# Patient Record
Sex: MALE | Race: WHITE | NOT HISPANIC OR LATINO | Employment: OTHER | URBAN - NONMETROPOLITAN AREA
[De-identification: names, ages, dates, MRNs, and addresses within clinical notes are randomized per-mention and may not be internally consistent; named-entity substitution may affect disease eponyms.]

---

## 2018-12-03 ENCOUNTER — OFFICE VISIT CONVERTED (OUTPATIENT)
Dept: FAMILY MEDICINE CLINIC | Age: 68
End: 2018-12-03
Attending: NURSE PRACTITIONER

## 2019-11-18 ENCOUNTER — HOSPITAL ENCOUNTER (OUTPATIENT)
Dept: SURGERY | Facility: CLINIC | Age: 69
Discharge: HOME | End: 2019-11-18
Attending: UROLOGY

## 2019-11-18 ENCOUNTER — OFFICE VISIT CONVERTED (OUTPATIENT)
Dept: UROLOGY | Facility: CLINIC | Age: 69
End: 2019-11-18
Attending: UROLOGY

## 2019-11-21 LAB
AMOXICILLIN+CLAV SUSC ISLT: 4
AMPICILLIN SUSC ISLT: >=32
AMPICILLIN+SULBAC SUSC ISLT: 16
BACTERIA UR CULT: ABNORMAL
CEFAZOLIN SUSC ISLT: >=64
CEFEPIME SUSC ISLT: 2
CEFTAZIDIME SUSC ISLT: 16
CEFTRIAXONE SUSC ISLT: >=64
CEFUROXIME ORAL SUSC ISLT: >=64
CEFUROXIME PARENTER SUSC ISLT: >=64
CIPROFLOXACIN SUSC ISLT: >=4
ERTAPENEM SUSC ISLT: <=0.5
GENTAMICIN SUSC ISLT: <=1
LEVOFLOXACIN SUSC ISLT: >=8
NITROFURANTOIN SUSC ISLT: <=16
TETRACYCLINE SUSC ISLT: >=16
TMP SMX SUSC ISLT: >=320
TOBRAMYCIN SUSC ISLT: <=1

## 2020-02-03 ENCOUNTER — HOSPITAL ENCOUNTER (OUTPATIENT)
Dept: OTHER | Facility: HOSPITAL | Age: 70
Discharge: HOME | End: 2020-02-03
Attending: UROLOGY

## 2020-02-05 LAB — BACTERIA UR CULT: NORMAL

## 2020-02-17 ENCOUNTER — HOSPITAL ENCOUNTER (OUTPATIENT)
Dept: OTHER | Facility: HOSPITAL | Age: 70
Discharge: HOME | End: 2020-02-17
Attending: UROLOGY

## 2020-03-09 ENCOUNTER — OFFICE VISIT CONVERTED (OUTPATIENT)
Dept: UROLOGY | Facility: CLINIC | Age: 70
End: 2020-03-09
Attending: UROLOGY

## 2020-03-09 ENCOUNTER — HOSPITAL ENCOUNTER (OUTPATIENT)
Dept: OTHER | Facility: HOSPITAL | Age: 70
Discharge: HOME | End: 2020-03-09
Attending: UROLOGY

## 2020-03-09 LAB
ANION GAP SERPL CALC-SCNC: 17 MMOL/L (ref 8–19)
BUN SERPL-MCNC: 22 MG/DL (ref 5–25)
BUN/CREAT SERPL: 17 {RATIO} (ref 6–20)
CALCIUM SERPL-MCNC: 9.8 MG/DL (ref 8.7–10.4)
CHLORIDE SERPL-SCNC: 102 MMOL/L (ref 99–111)
CONV CO2: 24 MMOL/L (ref 22–32)
CREAT UR-MCNC: 1.29 MG/DL (ref 0.7–1.2)
GFR SERPLBLD BASED ON 1.73 SQ M-ARVRAT: 56 ML/MIN/{1.73_M2}
GLUCOSE SERPL-MCNC: 122 MG/DL (ref 70–99)
OSMOLALITY SERPL CALC.SUM OF ELEC: 291 MOSM/KG (ref 273–304)
POTASSIUM SERPL-SCNC: 4.5 MMOL/L (ref 3.5–5.3)
SODIUM SERPL-SCNC: 138 MMOL/L (ref 135–147)

## 2020-06-15 ENCOUNTER — OFFICE VISIT CONVERTED (OUTPATIENT)
Dept: FAMILY MEDICINE CLINIC | Age: 70
End: 2020-06-15
Attending: FAMILY MEDICINE

## 2020-06-15 ENCOUNTER — HOSPITAL ENCOUNTER (OUTPATIENT)
Dept: OTHER | Facility: HOSPITAL | Age: 70
Discharge: HOME | End: 2020-06-15
Attending: FAMILY MEDICINE

## 2020-11-04 ENCOUNTER — HOSPITAL ENCOUNTER (OUTPATIENT)
Dept: OTHER | Facility: HOSPITAL | Age: 70
Discharge: HOME | End: 2020-11-04
Attending: FAMILY MEDICINE

## 2020-11-07 LAB — SARS-COV-2 RNA SPEC QL NAA+PROBE: NOT DETECTED

## 2021-05-15 VITALS — BODY MASS INDEX: 30.8 KG/M2 | HEIGHT: 70 IN | WEIGHT: 215.12 LBS | RESPIRATION RATE: 14 BRPM

## 2021-05-15 VITALS — HEIGHT: 70 IN | WEIGHT: 219.25 LBS | BODY MASS INDEX: 31.39 KG/M2 | RESPIRATION RATE: 18 BRPM

## 2021-05-18 NOTE — PROGRESS NOTES
Fredy Whitfield 1950     Office/Outpatient Visit    Visit Date: Mon, Dec 3, 2018 02:27 pm    Provider: Emily Reyes N.P. (Assistant: Mirna Tesfaye MA)    Location: [facility]        Electronically signed by Emily Reyes N.P. on  12/03/2018 03:41:39 PM                             SUBJECTIVE:        CC:     Luis is a 68 year old White male.  This is his first visit to the clinic.  Patient presents today for new patient establishment, also has complaints of nose being sore X 3 days         HPI: Fredy lives in both [place] and [place]. He moved back here for the winter about 6 weeks ago. He has history of DM2, afib, and CVA. Reports that he his family physician in [place] is Dr. Javid Damian and his cardiologist is Dr. Doyle. He last saw Dr. Damian about 6 weeks ago. HgbA1C was in the 8s. He is on injectable DM medication as well as Metformin but does not remember the name. On statin and Xarelto.     He presents today with c/o 3 day history of nasal soreness and swelling. Hurts on inside of nose and outside. His wife recently saw dermatologist Dr. Purdy. She has been diagnosed with staph infection. She is being treated with Minocycline 50 mg, Clindamycin topical, and nasal ointment.                     PHQ-9 Depression Screening: Completed form scanned and in chart; Total Score 2 Alcohol Consumption Screening: Completed form scanned and in chart; Total Score 1     ROS:     CONSTITUTIONAL:  Negative for chills and fever.      EYES:  Negative for blurred vision and eye drainage.      E/N/T:  Positive for nasal pain and swelling.   Negative for ear pain or sore throat.      CARDIOVASCULAR:  Negative for chest pain and palpitations.      RESPIRATORY:  Negative for dyspnea and frequent wheezing.          PMH/FMH/SH:     Last Reviewed on 12/03/2018 02:59 PM by Emily Reyes    Past Medical History:             PAST MEDICAL HISTORY         Atrial Fibrillation    Hyperlipidemia     Hypertension     Type 2 Diabetes     Cerebrovascular Accident         PREVENTIVE HEALTH MAINTENANCE             COLORECTAL CANCER SCREENING: Up to date (colonoscopy q10y; sigmoidoscopy q5y; Cologuard q3y) was last done 3/2018; colonoscopy with the following abnormalities noted-- Polyp(s); [facility]         Surgical History:         Tonsillectomy/Adenoidectomy      R Knee and L knee arthroscopy;    L Shoulder Cyst;    Neck Mass;         Family History:         Positive for Coronary Artery Disease ( father; mother ).      Positive for Cancer- type not specified ( brother ).      Positive for Cerebrovascular Accident ( brother ).          Social History:     Occupation: Retired (Prior occupation: [employer])     Marital Status:      Children: 1 child         Tobacco/Alcohol/Supplements:     Last Reviewed on 12/03/2018 02:59 PM by Emily Reyes    Tobacco:  Nonsmoker (never smoked);         Alcohol:    Drinks alcohol very infrequently.          Substance Abuse History:     Last Reviewed on 12/03/2018 02:59 PM by Emily Reyes    NEGATIVE         Mental Health History:     Last Reviewed on 12/03/2018 02:59 PM by Emily Reyes    NEGATIVE         Communicable Diseases (eg STDs):     Last Reviewed on 12/03/2018 02:59 PM by Emily Reyes    Reportable health conditions; NEGATIVE             Current Problems:     Last Reviewed on 12/03/2018 02:59 PM by Emily Reyes    Low back pain     Diaphoresis     Screening for depression         Immunizations:     Fluzone High-Dose pf (>=65 yr) 12/3/2018     PPD 6/19/2007         Allergies:     Last Reviewed on 12/03/2018 02:59 PM by Emily Reyes      No Known Drug Allergies.         Current Medications:     Last Reviewed on 12/03/2018 02:59 PM by Emily Reyes    Diltiazem HCl 240mg Capsules, Extended Release Take 1 capsule(s) by mouth daily     Atorvastatin Calcium 40mg Tablet 1 tab daily     Digoxin 125mcg Tablet 1 tab daily     Metformin HCl 500mg Tablet 2 po bid      Metoprolol 25mg Tablet 1 tab daily     Xarelto qd         OBJECTIVE:        Vitals:         Current: 12/3/2018 2:37:52 PM    Ht:  5 ft, 10.75 in;  Wt: 256.2 lbs;  BMI: 36.0    T: 98.1 F (oral);  BP: 125/67 mm Hg (left arm, sitting);  P: 78 bpm (left arm (BP Cuff), sitting);  sCr: 1.2 mg/dL;  GFR: 67.09        Exams:     PHYSICAL EXAM:     GENERAL: well developed, well nourished;  no apparent distress;     EYES: PERRL, EOMI     E/N/T: NOSE: normal turbinates; nose swollen, red, and tender to touch; OROPHARYNX: oral mucosa is normal; posterior pharynx shows no exudate;     RESPIRATORY: normal respiratory rate and pattern with no distress; normal breath sounds with no rales, rhonchi, wheezes or rubs;     CARDIOVASCULAR: normal rate; rhythm is regular;     MUSCULOSKELETAL: normal gait; no limb or joint pain with range of motion;     NEUROLOGIC: mental status: alert and oriented x 3; GROSSLY INTACT     PSYCHIATRIC: appropriate affect and demeanor;         ASSESSMENT           686.9   L08.9  Skin infection              DDx:     V79.0   Z13.89  Screening for depression              DDx:     250.00   E11.9  Type 2 diabetes              DDx:     427.31   I48.91  Atrial fibrillation              DDx:     438.9   I69.30  Prior CVA              DDx:         ORDERS:         Meds Prescribed:       Minocycline HCl 50mg Tablet Take 1 tablet(s) by mouth q12h  #20 (Twenty) tablet(s) Refills: 0       Clindamycin Phosphate 1% Topical Gel Apply in the monring and night  #60 (Sixty) gm Refills: 0         Other Orders:         Depression screen negative  (In-House)                   PLAN: To call with name of injectable DM medication, dose of Xarelto, and where he received colonoscopy.          Skin infection         FOLLOW-UP: Schedule follow-up appointments on a p.r.n. basis. Chronic visit follow up           Prescriptions:       Minocycline HCl 50mg Tablet Take 1 tablet(s) by mouth q12h  #20 (Twenty) tablet(s) Refills: 0        Clindamycin Phosphate 1% Topical Gel Apply in the monring and night  #60 (Sixty) gm Refills: 0          Screening for depression     MIPS PHQ-9 Depression Screening Completed form scanned and in chart; Total Score 2           Orders:         Depression screen negative  (In-House)            Type 2 diabetes Continue Metformin and injectable DM medication. Follow up with PCP as per his recommendations.         RECOMMENDATIONS: instructed in use of home glucose checks, adherance to Low carb, NCS diet diet, and a graduated exercise program.           Atrial fibrillation Continue Diltiazem, Digoxin, and Xarelto as ordered. Follow up with cardiologist as per his recommendations.          Prior CVA Continue statin and Xarelto. Follow up with PCP and cardiologist as above.             Patient Recommendations:        For  Skin infection:     Schedule follow-up appointments as needed.          For  Type 2 diabetes:     Obtain instruction in the proper use of a home blood glucose monitor. Follow a diabetic diet as directed. Maintain an exercise regimen as directed.              CHARGE CAPTURE           **Please note: ICD descriptions below are intended for billing purposes only and may not represent clinical diagnoses**        Primary Diagnosis:         686.9 Skin infection            L08.9    Local infection of the skin and subcutaneous tissue, unspecified              Orders:          35819   Office visit - new pt, level 3  (In-House)           V79.0 Screening for depression            Z13.89    Encounter for screening for other disorder              Orders:             Depression screen negative  (In-House)           250.00 Type 2 diabetes            E11.9    Type 2 diabetes mellitus without complications    427.31 Atrial fibrillation            I48.91    Unspecified atrial fibrillation    438.9 Prior CVA            I69.30    Unspecified sequelae of cerebral infarction

## 2021-05-18 NOTE — PROGRESS NOTES
Fredy Whitfield  1950     Office/Outpatient Visit    Visit Date: Mon, Alex 15, 2020 02:43 pm    Provider: Rosendo King MD (Assistant: Macie Alba RN)    Location: [facility]        Electronically signed by Rosendo King MD on  06/17/2020 05:10:28 PM                             Subjective:        CC: shoulder pain    HPI:       Luis is in today for follow up on L shoulder pain.  He has been dealing with this for the last couple of weeks.  He has been playing some tennis, but he is not aware of specific injury.  He says that he does feel a knot on the shoulder, but he thinks that has been there some time.  He was able to play tennis, but he does have pain since.  He notes some pain with range of motion.  He does take Tylenol as needed for this and has also applied some topical treatment.  He has not had previous shoulder surgery.    ROS:     CONSTITUTIONAL:  Negative for chills and fever.      CARDIOVASCULAR:  Negative for chest pain and palpitations.      RESPIRATORY:  Negative for recent cough and dyspnea.      GASTROINTESTINAL:  Negative for abdominal pain, nausea and vomiting.      INTEGUMENTARY:  Negative for atypical mole(s) and rash.          Past Medical History / Family History / Social History:         Last Reviewed on 6/15/2020 02:56 PM by Rosendo King    Past Medical History:             PAST MEDICAL HISTORY         Atrial Fibrillation    Hyperlipidemia    Hypertension     Type 2 Diabetes     Cerebrovascular Accident         CURRENT MEDICAL PROVIDERS:    Primary care provider ( Dr. Damian in [place] )    Cardiologist: Dr. Doyle in [place]         PREVENTIVE HEALTH MAINTENANCE             COLORECTAL CANCER SCREENING: Up to date (colonoscopy q10y; sigmoidoscopy q5y; Cologuard q3y) was last done 3/2018; [facility]         Surgical History:         Tonsillectomy/Adenoidectomy      R Knee and L knee arthroscopy;    L Shoulder Cyst;    Neck Mass;         Family History:         Positive for Coronary Artery Disease ( father; mother ).      Positive for Cancer- type not specified ( brother ).      Positive for Cerebrovascular Accident ( brother ).          Social History:     Occupation: Retired (Prior occupation: [employer])     Marital Status:      Children: 1 child         Tobacco/Alcohol/Supplements:     Last Reviewed on 6/15/2020 02:56 PM by Rosendo King    Tobacco:  Nonsmoker (never smoked);         Alcohol:    Drinks alcohol very infrequently.          Substance Abuse History:     Last Reviewed on 6/15/2020 02:56 PM by Rosendo King    NEGATIVE         Mental Health History:     Last Reviewed on 6/15/2020 02:56 PM by Rosendo King    NEGATIVE         Communicable Diseases (eg STDs):     Last Reviewed on 6/15/2020 02:56 PM by Rosendo King    Reportable health conditions; NEGATIVE         Current Problems:     Last Reviewed on 6/15/2020 02:56 PM by Rosendo King    Diaphoresis    Low back pain    Screening for depression    Type 2 diabetes mellitus without complications    Unspecified atrial fibrillation    Unspecified sequelae of cerebral infarction    Encounter for screening for other disorder    Type 2 diabetes    Prior CVA    Atrial fibrillation        Immunizations:     Fluzone High-Dose pf (>=65 yr) 12/3/2018    PPD 6/19/2007    Influenza, high dose seasonal 10/1/2019        Allergies:     Last Reviewed on 6/15/2020 02:56 PM by Rosendo King    No Known Allergies.        Current Medications:     Last Reviewed on 6/15/2020 02:56 PM by Rosendo King    Digoxin 125mcg Tablet [1 tab daily]    Metoprolol 25mg Tablet [1 tab daily]    atorvastatin 40 mg oral tablet [1/2 tab daily]    Diltiazem HCl 240mg Capsules, Extended Release [Take 1 capsule(s) by mouth daily]    Metformin HCl 500mg Tablet [2 po bid]    Xarelto 20mg  Tablet [1 tablet daily]    Trulicity PEN 0.75mg/0.5ml Injection [Inject once weekly]        Objective:        Vitals:         Current: 6/15/2020 2:45:39 PM    Ht:  5 ft, 10.75 in;  Wt: 218.8 lbs;  BMI: 30.7T: 97.6 F (temporal);  BP: 109/74 mm Hg (right arm, sitting);  P: 82 bpm (right arm (BP Cuff), sitting);  sCr: 1.2 mg/dL;  GFR: 61.90        Exams:     PHYSICAL EXAM:     GENERAL: Vitals recorded well developed, obese;     EYES: extraocular movements intact; conjunctiva and cornea are normal; PERRL;     E/N/T: EARS:  normal external auditory canals and tympanic membranes;  grossly normal hearing; OROPHARYNX:  normal mucosa, dentition, gingiva, and posterior pharynx;     NECK: range of motion is normal; thyroid is non-palpable;     RESPIRATORY: normal respiratory rate and pattern with no distress; normal breath sounds with no rales, rhonchi, wheezes or rubs;     CARDIOVASCULAR: normal rate; rhythm is regular;  no systolic murmur;     GASTROINTESTINAL: nontender; normal bowel sounds; no masses;     LYMPHATIC: no enlargement of cervical or facial nodes; no supraclavicular nodes;     SKIN:  no significant rashes or lesions; no suspicious moles;     MUSCULOSKELETAL: there is preserved ROM in the L shoulder - there is some discomfort noted with certain movements;     NEUROLOGIC: mental status: alert and oriented x 3; cranial nerves II-XII grossly intact;     PSYCHIATRIC: appropriate affect and demeanor; normal psychomotor function;         Assessment:         M25.512   Pain in left shoulder           ORDERS:         Radiology/Test Orders:       [id number]  Left Radiologic exam, shoulder; comp, 2 views  (Send-Out)            3017F  Colorectal CA screen results documented and reviewed (PV)  (In-House)              Other Orders:         Depression screen negative  (In-House)            1101F  Pt screen for fall risk; document no falls in past year or only 1 fall w/o injury in past year (MATT)  (In-House)            1124F   Advance Care Planning discussed and doc in MR; no surrogate named or advance care plan provided  (Send-Out)                      Plan:         Pain in left shoulder        RADIOLOGY:  I have ordered Shoulder x-ray: left shoulder to be done today.      RECOMMENDATIONS given include: Today, we have reviewed his care.  I'm going to X-ray the shoulder and see what it shows.  I have asked him to work on range of motion exercises gently over the next few days.  We will see how things progress.  If he does not see improvement, I may try a brief course of steroid, but I want to be cautious given the history of diabetes and blood pressure.  No changes are anticipated otherwise..  MIPS PHQ-9 Depression Screening: Completed form scanned and in chart; Total Score 3; Negative Depression Screen           Orders:       [id number]  Left Radiologic exam, shoulder; comp, 2 views  (Send-Out)              Depression screen negative  (In-House)            1101F  Pt screen for fall risk; document no falls in past year or only 1 fall w/o injury in past year (MATT)  (In-House)            3017F  Colorectal CA screen results documented and reviewed (PV)  (In-House)            1124F  Advance Care Planning discussed and doc in MR; no surrogate named or advance care plan provided  (Send-Out)                  Charge Capture:         Primary Diagnosis:     M25.512  Pain in left shoulder           Orders:      [id number]  Office/outpatient visit; established patient, level 3  (In-House)              Depression screen negative  (In-House)            1101F  Pt screen for fall risk; document no falls in past year or only 1 fall w/o injury in past year (MATT)  (In-House)            3017F  Colorectal CA screen results documented and reviewed (PV)  (In-House)

## 2021-07-01 VITALS
DIASTOLIC BLOOD PRESSURE: 67 MMHG | SYSTOLIC BLOOD PRESSURE: 125 MMHG | BODY MASS INDEX: 35.87 KG/M2 | HEIGHT: 71 IN | HEART RATE: 78 BPM | TEMPERATURE: 98.1 F | WEIGHT: 256.2 LBS

## 2021-07-02 VITALS
BODY MASS INDEX: 30.63 KG/M2 | HEART RATE: 82 BPM | SYSTOLIC BLOOD PRESSURE: 109 MMHG | WEIGHT: 218.8 LBS | DIASTOLIC BLOOD PRESSURE: 74 MMHG | TEMPERATURE: 97.6 F | HEIGHT: 71 IN

## 2021-08-23 ENCOUNTER — OFFICE VISIT (OUTPATIENT)
Dept: FAMILY MEDICINE CLINIC | Age: 71
End: 2021-08-23

## 2021-08-23 VITALS
HEART RATE: 79 BPM | SYSTOLIC BLOOD PRESSURE: 115 MMHG | TEMPERATURE: 97.8 F | HEIGHT: 71 IN | BODY MASS INDEX: 30.77 KG/M2 | WEIGHT: 219.8 LBS | DIASTOLIC BLOOD PRESSURE: 88 MMHG

## 2021-08-23 DIAGNOSIS — M54.50 ACUTE MIDLINE LOW BACK PAIN WITHOUT SCIATICA: Primary | ICD-10-CM

## 2021-08-23 PROBLEM — I10 ESSENTIAL HYPERTENSION: Status: ACTIVE | Noted: 2021-08-23

## 2021-08-23 PROBLEM — I48.91 AFIB (HCC): Status: ACTIVE | Noted: 2021-08-23

## 2021-08-23 PROBLEM — E78.00 HIGH CHOLESTEROL: Status: ACTIVE | Noted: 2021-08-23

## 2021-08-23 PROBLEM — E11.9 TYPE 2 DIABETES MELLITUS: Status: ACTIVE | Noted: 2021-08-23

## 2021-08-23 PROCEDURE — 99213 OFFICE O/P EST LOW 20 MIN: CPT | Performed by: FAMILY MEDICINE

## 2021-08-23 RX ORDER — DILTIAZEM HYDROCHLORIDE 240 MG/1
240 CAPSULE, EXTENDED RELEASE ORAL DAILY
COMMUNITY

## 2021-08-23 RX ORDER — METOPROLOL SUCCINATE 25 MG/1
25 TABLET, EXTENDED RELEASE ORAL DAILY
COMMUNITY
Start: 2021-06-15

## 2021-08-23 RX ORDER — ATORVASTATIN CALCIUM 40 MG/1
40 TABLET, FILM COATED ORAL DAILY
COMMUNITY
Start: 2021-07-19

## 2021-08-23 RX ORDER — DULAGLUTIDE 1.5 MG/.5ML
1.5 INJECTION, SOLUTION SUBCUTANEOUS WEEKLY
COMMUNITY
Start: 2021-07-29

## 2021-08-23 RX ORDER — DIGOXIN 125 MCG
0.12 TABLET ORAL DAILY
COMMUNITY
Start: 2021-08-07

## 2021-08-23 NOTE — PROGRESS NOTES
Chief Complaint  Back Pain (low back)    Anthony Whitfield presents to [facility]  --MID LOW BACK PAIN WITHOUT RADIATION.  BEGAN THREE DAYS AGO AFTER STANDING FOR THREE HOURS.  S/W BETTER TODAY.  NO PRIOR BACK INJURIES OR SURGERIES.  NO BOWEL OR BLADDER ISSUES.            No Known Allergies     Health Maintenance Due   Topic Date Due   • COLORECTAL CANCER SCREENING  Never done   • ANNUAL PHYSICAL  Never done   • Pneumococcal Vaccine 65+ (1 of 2 - PPSV23) Never done   • TDAP/TD VACCINES (1 - Tdap) Never done   • ZOSTER VACCINE (1 of 2) Never done   • HEPATITIS C SCREENING  Never done   • LIPID PANEL  Never done        Current Outpatient Medications on File Prior to Visit   Medication Sig   • atorvastatin (LIPITOR) 40 MG tablet Take 40 mg by mouth Daily.   • digoxin (LANOXIN) 125 MCG tablet Take 0.125 mg by mouth Daily.   • dilTIAZem (TIAZAC) 240 MG 24 hr capsule Take 240 mg by mouth Daily.   • metFORMIN (GLUCOPHAGE) 500 MG tablet Take 500 mg by mouth 2 (two) times a day.   • metoprolol succinate XL (TOPROL-XL) 25 MG 24 hr tablet Take 25 mg by mouth Daily.   • rivaroxaban (Xarelto) 20 MG tablet Take 20 mg by mouth Daily.   • Trulicity 1.5 MG/0.5ML solution pen-injector Inject 1.5 mg under the skin into the appropriate area as directed 1 (One) Time Per Week.     No current facility-administered medications on file prior to visit.       Immunization History   Administered Date(s) Administered   • COVID-19 (PFIZER) 02/02/2021, 02/23/2021   • Influenza, Unspecified 10/01/2019   • PPD Test 06/19/2007       Review of Systems   Constitutional: Negative for activity change, appetite change, chills, fatigue and fever.   Cardiovascular: Negative for leg swelling.   Gastrointestinal: Negative for abdominal pain, nausea and vomiting.   Genitourinary: Negative for dysuria, hematuria and urinary incontinence.   Musculoskeletal: Positive for back pain. Negative for arthralgias and  "myalgias.   Neurological: Negative for weakness and numbness.        Objective     /88 (BP Location: Left arm, Patient Position: Sitting)   Pulse 79   Temp 97.8 °F (36.6 °C) (Oral)   Ht 179.7 cm (70.75\")   Wt 99.7 kg (219 lb 12.8 oz)   BMI 30.87 kg/m²       Physical Exam  Vitals and nursing note reviewed.   Constitutional:       General: He is not in acute distress.  Pulmonary:      Effort: Pulmonary effort is normal.   Musculoskeletal:         General: No swelling. Normal range of motion.      Cervical back: Neck supple.      Right lower leg: No edema.      Left lower leg: No edema.      Comments: INDICATED AREA OF SACRUM BUT NONTENDER TO PALPATION.  GOOD ROM, NEG SLR   Lymphadenopathy:      Cervical: No cervical adenopathy.   Neurological:      General: No focal deficit present.      Mental Status: He is alert.      Cranial Nerves: No cranial nerve deficit.      Coordination: Coordination normal.      Gait: Gait normal.   Psychiatric:         Mood and Affect: Mood normal.         Behavior: Behavior normal.         Result Review :                             Assessment and Plan      Diagnoses and all orders for this visit:    1. Acute midline low back pain without sciatica (Primary)  Assessment & Plan:  OBSERVE AS IMPROVEING, CONSIDER X-RAYS AND P.T. EVAL.  ADVISE HEAT, ICE, STRETCHING, OTC MEDS PRN.              Follow Up     Return if symptoms worsen or fail to improve.    Patient was given instructions and counseling regarding his condition or for health maintenance advice. Please see specific information pulled into the AVS if appropriate.       "

## 2021-08-23 NOTE — ASSESSMENT & PLAN NOTE
OBSERVE AS IMPROVEING, CONSIDER X-RAYS AND P.T. EVAL.  ADVISE HEAT, ICE, STRETCHING, OTC MEDS PRN.

## 2021-10-19 ENCOUNTER — OFFICE VISIT (OUTPATIENT)
Dept: FAMILY MEDICINE CLINIC | Age: 71
End: 2021-10-19

## 2021-10-19 VITALS
HEART RATE: 79 BPM | BODY MASS INDEX: 29.65 KG/M2 | TEMPERATURE: 98.3 F | WEIGHT: 211.8 LBS | SYSTOLIC BLOOD PRESSURE: 141 MMHG | DIASTOLIC BLOOD PRESSURE: 71 MMHG | HEIGHT: 71 IN

## 2021-10-19 DIAGNOSIS — M54.41 ACUTE RIGHT-SIDED LOW BACK PAIN WITH RIGHT-SIDED SCIATICA: Primary | ICD-10-CM

## 2021-10-19 PROCEDURE — 99213 OFFICE O/P EST LOW 20 MIN: CPT | Performed by: NURSE PRACTITIONER

## 2021-10-19 RX ORDER — CHLORZOXAZONE 500 MG/1
500 TABLET ORAL 3 TIMES DAILY PRN
Qty: 40 TABLET | Refills: 1 | Status: SHIPPED | OUTPATIENT
Start: 2021-10-19 | End: 2022-12-21

## 2021-10-19 RX ORDER — PREDNISONE 20 MG/1
20 TABLET ORAL
COMMUNITY
Start: 2021-10-18 | End: 2021-11-11

## 2021-10-19 NOTE — PROGRESS NOTES
"Chief Complaint  Sciatica (taking prednisone from PCP in [place])    Subjective    Patient is in today for lower right back pain that began on Saturday after walking around at a festival. Patient denies injury. Patient reports a history of right knee pain which he believes may have started his back pain. He has seen an orthopedic doctor in the past. Patient reports low back pain radiates down right leg. Patient has a PCP in [place] which he called and was placed on prednisone. Patient reports minimal relief with steroid use at this time.          Fredy Whitfield presents to [facility]  Back Pain  This is a recurrent problem. The current episode started 1 to 4 weeks ago. The problem occurs intermittently. The problem has been gradually worsening since onset. The pain is present in the lumbar spine. The quality of the pain is described as shooting. The pain radiates to the right thigh and right knee. The pain is at a severity of 7/10. The pain is moderate. The symptoms are aggravated by bending and standing. Stiffness is present in the morning. Associated symptoms include weakness. Pertinent negatives include no dysuria, fever, numbness or tingling. He has tried NSAIDs and ice for the symptoms. The treatment provided mild relief.       Objective   Vital Signs:   /71 (BP Location: Left arm, Patient Position: Sitting)   Pulse 79   Temp 98.3 °F (36.8 °C) (Oral)   Ht 179.7 cm (70.75\")   Wt 96.1 kg (211 lb 12.8 oz)   BMI 29.75 kg/m²     Physical Exam  HENT:      Head: Normocephalic.   Cardiovascular:      Rate and Rhythm: Normal rate and regular rhythm.      Pulses: Normal pulses.      Heart sounds: Normal heart sounds.   Pulmonary:      Effort: Pulmonary effort is normal.      Breath sounds: Normal breath sounds.   Musculoskeletal:      Lumbar back: Tenderness present. No swelling. Positive right straight leg raise test and positive left straight leg raise test.      Right " knee: No swelling. Normal range of motion. Tenderness present. Normal pulse.   Skin:     General: Skin is warm and dry.      Capillary Refill: Capillary refill takes less than 2 seconds.   Neurological:      Mental Status: He is alert and oriented to person, place, and time.   Psychiatric:         Mood and Affect: Mood normal.        Result Review :                 Assessment and Plan    Diagnoses and all orders for this visit:    1. Acute right-sided low back pain with right-sided sciatica (Primary)  -     chlorzoxazone (PARAFON FORTE) 500 MG tablet; Take 1 tablet by mouth 3 (Three) Times a Day As Needed for Muscle Spasms.  Dispense: 40 tablet; Refill: 1        Follow Up   No follow-ups on file.  Patient was given instructions and counseling regarding his condition or for health maintenance advice. Please see specific information pulled into the AVS if appropriate.

## 2021-10-25 ENCOUNTER — TELEPHONE (OUTPATIENT)
Dept: FAMILY MEDICINE CLINIC | Age: 71
End: 2021-10-25

## 2021-10-25 DIAGNOSIS — K80.00 CALCULUS OF GALLBLADDER WITH ACUTE CHOLECYSTITIS WITHOUT OBSTRUCTION: ICD-10-CM

## 2021-10-25 DIAGNOSIS — M54.41 ACUTE RIGHT-SIDED LOW BACK PAIN WITH RIGHT-SIDED SCIATICA: Primary | ICD-10-CM

## 2021-10-25 NOTE — TELEPHONE ENCOUNTER
Hub to read    Pt called asking about going forward with referral, he states he is no better and his back pain is still bothering him. Best call back number is [phone]

## 2021-10-27 ENCOUNTER — HOSPITAL ENCOUNTER (OUTPATIENT)
Dept: GENERAL RADIOLOGY | Facility: HOSPITAL | Age: 71
Discharge: HOME | End: 2021-10-27
Admitting: NURSE PRACTITIONER

## 2021-10-27 DIAGNOSIS — M54.41 ACUTE RIGHT-SIDED LOW BACK PAIN WITH RIGHT-SIDED SCIATICA: ICD-10-CM

## 2021-10-27 PROCEDURE — 72100 X-RAY EXAM L-S SPINE 2/3 VWS: CPT

## 2021-10-27 NOTE — PROGRESS NOTES
Xray shows ankylosing spondylitis, patient currently taking steroids. Would recommend follow up with PCP to discuss long term treatment. I ordered an US of his gallbladder due to suspected cholelithiasis.

## 2021-10-28 ENCOUNTER — TELEPHONE (OUTPATIENT)
Dept: FAMILY MEDICINE CLINIC | Age: 71
End: 2021-10-28

## 2021-10-28 NOTE — TELEPHONE ENCOUNTER
Caller: RENAY JOSÉ    Relationship: Emergency Contact    Best call back number:  [phone]     What is the best time to reach you: ANYTIME    Who are you requesting to speak with (clinical staff, provider,  specific staff member): CLINICAL      What was the call regarding:     PATIENT'S WIFE CALLED AND STATED THEY RECEIVED A CALL FOR A GALL BLADDER TEST  OR STUDY AND THEY WANT TO KNOW WHY IT IS NEEDED. SHE STATED THEY ARE NOT ABLE TO LOOK AT MY-CHART TO SEE IF THERE ARE NOTES ABOUT THIS. PATIENT'S APPOINTMENT IS ON 11/11/21 AT 9 AM. PLEASE CALL AND ADVISE.    Do you require a callback: YES

## 2021-11-10 ENCOUNTER — TREATMENT (OUTPATIENT)
Dept: PHYSICAL THERAPY | Facility: CLINIC | Age: 71
End: 2021-11-10

## 2021-11-10 DIAGNOSIS — M54.41 ACUTE RIGHT-SIDED LOW BACK PAIN WITH RIGHT-SIDED SCIATICA: Primary | ICD-10-CM

## 2021-11-10 PROCEDURE — G0283 ELEC STIM OTHER THAN WOUND: HCPCS | Performed by: PHYSICAL THERAPIST

## 2021-11-10 PROCEDURE — 97110 THERAPEUTIC EXERCISES: CPT | Performed by: PHYSICAL THERAPIST

## 2021-11-10 PROCEDURE — 97162 PT EVAL MOD COMPLEX 30 MIN: CPT | Performed by: PHYSICAL THERAPIST

## 2021-11-10 NOTE — PROGRESS NOTES
Physical Therapy Initial Evaluation and Plan of Care      Patient: Fredy Whitfield   : 1950  Diagnosis/ICD-10 Code:  Acute right-sided low back pain with right-sided sciatica [M54.41]  Referring practitioner: DAVI Paredes  Date of Initial Visit: 11/10/2021  Today's Date: 11/10/2021  Patient seen for 1 sessions           Visit Diagnoses:    ICD-10-CM ICD-9-CM   1. Acute right-sided low back pain with right-sided sciatica  M54.41 724.2     724.3         Subjective Evaluation    History of Present Illness  Mechanism of injury: Pt presents to [facility] PHYSICAL THERAPY  to be evaluated for LBP, R LE pain PT relates aprox .    Pt relates past medical  history  and current concerns.     Pt relates aprox 3 weeks ago, after a walk, sat to rest and was unable to get up due to severe R lower back pain. States back was sore for a day or two, then began to feel pain radiate to R LE - buttock, ant thigh to ant knee.      States the back has has improved in the last week, but is still painful to transition in bed, to  one place or to walk too long. Pt relates limps when walking due to pain to load R LE.      has not been able to play tennis, usually plays weekly or do his daily walking . States has had difficulty sleeping.     Pt I in ADLs, driving , but had difficulty dressing initially.     PMH: stroke in 2008 - recovered function, A-fib, DM        Patient Occupation: retired, play tennis, prior restaunteer  Quality of life: good    Pain  Current pain ratin  Location: R LB sitting  5/10, standing 8-9/10  Quality: dull ache and throbbing  Relieving factors: medications and heat  Aggravating factors: standing and sleeping    Hand dominance: right    Patient Goals  Patient goals for therapy: decreased pain, improved balance, increased motion and return to sport/leisure activities  Patient goal: resume hobbies, tennis, wlaking pain  free           Objective          Ambulation      Comments   Antalgic gait pattern, decreased WB R LE, decreased stance time R LE    TTP over R lumbar paraspinals, sciatic notch. Muscle rigidity noted in paraspinals    Lumbar AROM %:  Flexion aprox 50 degrees with c/o pulling in R LB  Extension 10 degrees with c/o sharp pain   Right SB 25% with c/o pain   Left SB 30%  Right Rotation 30% with c/o pain   Left Rotation 40%    LE AROM: pt has c/o pain in LB with R knee ext while sitting    MMT:   No focal weakness noted in B LEs, mil dc/o pain with testing due to need to stabilize trunk  Isometric trunk strength testing in sitting, no c/o             Assessment & Plan     Assessment  Impairments: abnormal or restricted ROM, activity intolerance, impaired physical strength, lacks appropriate home exercise program, pain with function and weight-bearing intolerance  Other impairment: decreased core / trunk strength / stabilization  Assessment details: Pt presents with decreased functional ability due to c/o LBP.      Pt demonstrates/ relates   -limited painful trunk AROM,   -interference with sleeping due to pain,   -poor trunk/core stabilization,   -limited ability to perform ADLs pain free,   -limitation with community/home/recreational activities due to LBP ,   -activity intolerance  -lack of daily aerobic exercise,   -lack of  progressive HEP .    Pt would benefit from skilled physical  therapy intervention to address the above mentioned deficits.     Pt was given WHEP of today's exercises.  Pt related understanding of precautions, progression parameters. Questions were addressed as they arose.          Barriers to therapy: none noted at this time  Prognosis: good  Functional Limitations: lifting, sleeping, walking, pulling, uncomfortable because of pain, moving in bed, sitting and standing  Goals  Plan Goals: Short Term Goals (3 weeks):      Pt is independent with HEP ea visit.    Pt is able to sleep without being disrupted by pain.    Pt has functional trunk  AROM with min/ no c/o pain or stiffness.    Pt has greater than 50% improvement overall.      Pt has minimal to no tenderness to palpation over R LB.      Pt will be I in energy conservation techniques.        Long Term Goals (6 weeks)    Pt is able to return to work/community activities with minimal to no discomfort.     Pt will resume tennis with minimal restrictions.    Pt is able stand for as long as needed for home/community related tasks.    Pt is able to sit for as long as needed for home/community related tasks.    Pt is able to walk for as long as needed for home/community related tasks.    Pt will report resuming daily aerobic conditioning program with min/no, limitation.    Pt will demonstrate proper body mechanics while performing house hold chores; sweeping, mopping, vacuuming, lifting.     Pt will be I in final HEP to perform after formal physical therapy has been discontinued.      Plan  Therapy options: will be seen for skilled physical therapy services  Planned modality interventions: dry needling, thermotherapy (hydrocollator packs), cryotherapy and high voltage pulsed current (pain management)  Other planned modality interventions: any modalities as indicated to benefit the pt  Planned therapy interventions: abdominal trunk stabilization, body mechanics training, flexibility, functional ROM exercises, home exercise program, manual therapy, neuromuscular re-education, postural training, soft tissue mobilization, spinal/joint mobilization, strengthening, stretching, therapeutic activities and transfer training  Other planned therapy interventions: any other intervention deemed necessary to benefit the pt  Frequency: 2x week  Duration in visits: 12  Duration in weeks: 6  Treatment plan discussed with: patient and PTA  Plan details: Will continue therapy involving education, stretching, strengthening, stabilization training, modalities as indicated, manual therapy techniques as indicated, progressive  HEP instruction.    Next visit, reassess tolerance to current exercises and modify and/or progress as indicated.         Planned interventions:  Any other modality and/or intervention deemed necessary to benefit the patient.        History # of Personal Factors and/or Comorbidities: HIGH (3+)  Examination of Body System(s): # of elements: HIGH (4+)  Clinical Presentation: STABLE   Clinical Decision Making: MODERATE      Timed:  Manual Therapy:         mins  08980;  Therapeutic Exercise:    10    mins  13304;     Neuromuscular Lorie:        mins  04453;    Therapeutic Activity:          mins  87270;     Gait Training:           mins  15615;     Ultrasound:          mins  25332;    Canalith Repos           ___  mins  92089      Untimed:  Electrical Stimulation:    15     mins  30570 ( );  Mechanical Traction:         mins  69332;     Low Complexity Evaluation:                           mins  05740;  Moderate Complexity Evaluation:              35    mins  82479;   High Complexity Evaluation:                          mins  09777       Timed Treatment:   25   mins   Total Treatment:     60   mins      DATE TREATMENT INITIATED: 11/10/2021              PT SIGNATURE: Shayy Salinas PT MS,PT  [place] License: [id number]  This document has been electronically signed by Shayy Salinas PT on November 10, 2021 16:56 EST        Initial Certification    Certification Period: 11/10/2021 thru 2/7/2022  I certify that the therapy services are furnished while this patient is under my care.  The services outlined above are required by this patient, and will be reviewed every 90 days.     PHYSICIAN: Margarita Ybarra APRN       PHYSICIAN PRINT NAME: ______________________________________________      PHYSICIAN SIGNATURE: ______________________________________________         DATE:________________________________

## 2021-11-11 ENCOUNTER — OFFICE VISIT (OUTPATIENT)
Dept: FAMILY MEDICINE CLINIC | Age: 71
End: 2021-11-11

## 2021-11-11 ENCOUNTER — HOSPITAL ENCOUNTER (OUTPATIENT)
Dept: ULTRASOUND IMAGING | Facility: HOSPITAL | Age: 71
Discharge: HOME | End: 2021-11-11
Admitting: NURSE PRACTITIONER

## 2021-11-11 VITALS
DIASTOLIC BLOOD PRESSURE: 68 MMHG | TEMPERATURE: 97.8 F | HEART RATE: 70 BPM | BODY MASS INDEX: 29.85 KG/M2 | WEIGHT: 213.2 LBS | SYSTOLIC BLOOD PRESSURE: 126 MMHG | HEIGHT: 71 IN

## 2021-11-11 DIAGNOSIS — K80.00 CALCULUS OF GALLBLADDER WITH ACUTE CHOLECYSTITIS WITHOUT OBSTRUCTION: ICD-10-CM

## 2021-11-11 DIAGNOSIS — K80.20 GALLSTONES: Primary | ICD-10-CM

## 2021-11-11 DIAGNOSIS — M54.50 ACUTE MIDLINE LOW BACK PAIN WITHOUT SCIATICA: ICD-10-CM

## 2021-11-11 PROCEDURE — 76705 ECHO EXAM OF ABDOMEN: CPT

## 2021-11-11 PROCEDURE — 99213 OFFICE O/P EST LOW 20 MIN: CPT | Performed by: FAMILY MEDICINE

## 2021-11-11 NOTE — PROGRESS NOTES
Chief Complaint  Back Pain (also discuss gallbladder U/S)    Subjective          Fredy Whitfield presents to [facility]  --HIS LOW BACK PAIN FROM AUGUST RECURRED A FEW WEEKS AGO.  X-RAYS SHOWED SOME DJD, IMPROVING WITH P.T.  --INCIDENTAL FINDING OF GALLSTONES BUT NO APPARENT EVIDENCE OF CHOLECYSTITIS.            No Known Allergies     Health Maintenance Due   Topic Date Due   • URINE MICROALBUMIN  Never done   • COLORECTAL CANCER SCREENING  Never done   • Pneumococcal Vaccine 65+ (1 of 2 - PPSV23) Never done   • ZOSTER VACCINE (1 of 2) Never done   • HEPATITIS C SCREENING  Never done   • ANNUAL WELLNESS VISIT  Never done   • DIABETIC FOOT EXAM  Never done   • DIABETIC EYE EXAM  Never done        Current Outpatient Medications on File Prior to Visit   Medication Sig   • atorvastatin (LIPITOR) 40 MG tablet Take 40 mg by mouth Daily.   • chlorzoxazone (PARAFON FORTE) 500 MG tablet Take 1 tablet by mouth 3 (Three) Times a Day As Needed for Muscle Spasms.   • digoxin (LANOXIN) 125 MCG tablet Take 0.125 mg by mouth Daily.   • dilTIAZem (TIAZAC) 240 MG 24 hr capsule Take 240 mg by mouth Daily.   • metFORMIN (GLUCOPHAGE) 500 MG tablet Take 500 mg by mouth 2 (two) times a day.   • metoprolol succinate XL (TOPROL-XL) 25 MG 24 hr tablet Take 25 mg by mouth Daily.   • rivaroxaban (Xarelto) 20 MG tablet Take 20 mg by mouth Daily.   • Trulicity 1.5 MG/0.5ML solution pen-injector Inject 1.5 mg under the skin into the appropriate area as directed 1 (One) Time Per Week.   • [DISCONTINUED] predniSONE (DELTASONE) 20 MG tablet Take 20 mg by mouth.     No current facility-administered medications on file prior to visit.       Immunization History   Administered Date(s) Administered   • COVID-19 (PFIZER) 02/02/2021, 02/23/2021, 10/27/2021   • Influenza, Unspecified 10/01/2019   • PPD Test 06/19/2007       Review of Systems   Constitutional: Negative for activity change, appetite change, chills, fatigue and  "fever.   HENT: Negative for congestion, ear pain, rhinorrhea and sore throat.    Eyes: Negative for blurred vision and discharge.   Respiratory: Negative for cough and shortness of breath.    Cardiovascular: Negative for chest pain, palpitations and leg swelling.   Gastrointestinal: Negative for abdominal pain, constipation, diarrhea, nausea and vomiting.   Genitourinary: Negative for dysuria and hematuria.   Musculoskeletal: Negative for arthralgias and myalgias.   Neurological: Negative for headache.        Objective     /68 (BP Location: Left arm, Patient Position: Sitting)   Pulse 70   Temp 97.8 °F (36.6 °C) (Oral)   Ht 179.7 cm (70.75\")   Wt 96.7 kg (213 lb 3.2 oz)   BMI 29.95 kg/m²       Physical Exam  Vitals and nursing note reviewed.   Constitutional:       General: He is not in acute distress.     Appearance: Normal appearance.   Cardiovascular:      Rate and Rhythm: Normal rate and regular rhythm.      Heart sounds: Normal heart sounds. No murmur heard.      Pulmonary:      Effort: Pulmonary effort is normal.      Breath sounds: Normal breath sounds.   Abdominal:      Palpations: Abdomen is soft.      Tenderness: There is no abdominal tenderness.   Musculoskeletal:      Cervical back: Neck supple.      Right lower leg: No edema.      Left lower leg: No edema.      Comments: TENDR RIGHT S.I. AREA BUT GOOD R.O.M. AND NEG SLR    Lymphadenopathy:      Cervical: No cervical adenopathy.   Neurological:      General: No focal deficit present.      Mental Status: He is alert.      Cranial Nerves: No cranial nerve deficit.      Coordination: Coordination normal.      Gait: Gait normal.   Psychiatric:         Mood and Affect: Mood normal.         Behavior: Behavior normal.         Result Review :                             Assessment and Plan      Diagnoses and all orders for this visit:    1. Gallstones (Primary)  Assessment & Plan:  OBSERVE AS ASYMPTOMATIC BUT WOULD BE QUICK TO WORKUP OR REFER IF HE " DEVELOPS G.I. SYMPTOMS       2. Acute midline low back pain without sciatica  Assessment & Plan:  OBSERVE AS IMPROVED.  CONTINUE P.T.  WOULD CONSIDER MRI OR NEUROSURGICAL EVAL IF SYMPTOMS DO NOT RESOLVE OR CONTINUE TO RECUR             Follow Up     Return if symptoms worsen or fail to improve.    Patient was given instructions and counseling regarding his condition or for health maintenance advice. Please see specific information pulled into the AVS if appropriate.

## 2021-11-11 NOTE — ASSESSMENT & PLAN NOTE
OBSERVE AS IMPROVED.  CONTINUE P.T.  WOULD CONSIDER MRI OR NEUROSURGICAL EVAL IF SYMPTOMS DO NOT RESOLVE OR CONTINUE TO RECUR

## 2021-11-12 NOTE — PROGRESS NOTES
General surgeon referral for further evaluation of cholelithiasis, ask if patient has a preference of who he wants to see.

## 2021-12-27 ENCOUNTER — DOCUMENTATION (OUTPATIENT)
Dept: PHYSICAL THERAPY | Facility: CLINIC | Age: 71
End: 2021-12-27

## 2021-12-27 DIAGNOSIS — M54.41 ACUTE RIGHT-SIDED LOW BACK PAIN WITH RIGHT-SIDED SCIATICA: Primary | ICD-10-CM

## 2021-12-27 NOTE — PROGRESS NOTES
Discharge Summary from Physical Therapy        Patient Information  Fredy Whitfield  1950  Diagnosis/ICD - 10 Code:  Acute right-sided low back pain with right-sided sciatica [M54.41]  Referring practitioner: No ref. provider found  Date of initial visit: 12/27/2021  Today's date: 12/27/2021  Patient was seen for Visit count could not be calculated. Make sure you are using a visit which is associated with an episode. sessions      Visit Diagnoses:    ICD-10-CM ICD-9-CM   1. Acute right-sided low back pain with right-sided sciatica  M54.41 724.2     724.3         Discharge Status of Patient: See PT Note dated 11/10/21    Goals: Not Met    Discharge Plan: Pt did not return after initial evaluation. Pt was being seen by physician to address possible gall bladder involvement.                     PT SIGNATURE: Shayy Salinas, PT MS,PT  [place] License: [id number]    This document has been electronically signed by Shayy Salinas, PT on December 27, 2021 09:35 EST

## 2022-12-21 ENCOUNTER — HOSPITAL ENCOUNTER (OUTPATIENT)
Dept: ULTRASOUND IMAGING | Facility: HOSPITAL | Age: 72
Discharge: HOME | End: 2022-12-21

## 2022-12-21 ENCOUNTER — OFFICE VISIT (OUTPATIENT)
Dept: FAMILY MEDICINE CLINIC | Age: 72
End: 2022-12-21

## 2022-12-21 VITALS
TEMPERATURE: 97.6 F | SYSTOLIC BLOOD PRESSURE: 124 MMHG | BODY MASS INDEX: 29.76 KG/M2 | DIASTOLIC BLOOD PRESSURE: 85 MMHG | HEART RATE: 53 BPM | WEIGHT: 212.6 LBS | HEIGHT: 71 IN

## 2022-12-21 DIAGNOSIS — N39.43 DRIBBLING URINE: ICD-10-CM

## 2022-12-21 DIAGNOSIS — R35.0 URINARY FREQUENCY: Primary | ICD-10-CM

## 2022-12-21 LAB
BACTERIA UR QL AUTO: ABNORMAL /HPF
BILIRUB UR QL STRIP: NEGATIVE
CLARITY UR: CLEAR
COLOR UR: YELLOW
GLUCOSE UR STRIP-MCNC: NEGATIVE MG/DL
HGB UR QL STRIP.AUTO: ABNORMAL
KETONES UR QL STRIP: NEGATIVE
LEUKOCYTE ESTERASE UR QL STRIP.AUTO: ABNORMAL
NITRITE UR QL STRIP: NEGATIVE
PH UR STRIP.AUTO: <=5 [PH] (ref 5–8)
PROT UR QL STRIP: NEGATIVE
RBC # UR STRIP: ABNORMAL /HPF
REF LAB TEST METHOD: ABNORMAL
SP GR UR STRIP: 1.02 (ref 1–1.03)
SQUAMOUS #/AREA URNS HPF: ABNORMAL /HPF
UROBILINOGEN UR QL STRIP: ABNORMAL
WBC # UR STRIP: ABNORMAL /HPF

## 2022-12-21 PROCEDURE — 81001 URINALYSIS AUTO W/SCOPE: CPT

## 2022-12-21 PROCEDURE — 51798 US URINE CAPACITY MEASURE: CPT

## 2022-12-21 PROCEDURE — 87086 URINE CULTURE/COLONY COUNT: CPT

## 2022-12-21 PROCEDURE — 99213 OFFICE O/P EST LOW 20 MIN: CPT

## 2022-12-21 NOTE — PROGRESS NOTES
"Subjective     CHIEF COMPLAINT    Chief Complaint   Patient presents with   • Urinary Frequency     Frequent urination x a couple weeks     History of Present Illness  Patient is a 72 year old male who presents to the clinic today with complaints of urinary frequency. Symptoms have been present for several weeks. He denies any fever, chills, nausea, vomiting, abdominal pain, back pain, hematuria. Denies any penile discharge/pain. Reports nocturia as well. Denies dysuria. Endorses some dribbling of urine. Has had one UTI in the past, denies a history of BPH or prostate issues.       Review of Systems   Constitutional: Negative for chills and fever.   Gastrointestinal: Negative for abdominal pain.   Genitourinary: Positive for frequency. Negative for dysuria, flank pain, hematuria, penile discharge, penile pain, penile swelling, scrotal swelling, testicular pain and urgency.   Musculoskeletal: Negative for back pain.     No Known Allergies    Current Outpatient Medications on File Prior to Visit   Medication Sig Dispense Refill   • atorvastatin (LIPITOR) 40 MG tablet Take 40 mg by mouth Daily.     • digoxin (LANOXIN) 125 MCG tablet Take 0.125 mg by mouth Daily.     • dilTIAZem (TIAZAC) 240 MG 24 hr capsule Take 240 mg by mouth Daily.     • metFORMIN (GLUCOPHAGE) 500 MG tablet Take 500 mg by mouth 2 (two) times a day.     • metoprolol succinate XL (TOPROL-XL) 25 MG 24 hr tablet Take 25 mg by mouth Daily.     • rivaroxaban (XARELTO) 20 MG tablet Take 20 mg by mouth Daily.     • Trulicity 1.5 MG/0.5ML solution pen-injector Inject 1.5 mg under the skin into the appropriate area as directed 1 (One) Time Per Week.       No current facility-administered medications on file prior to visit.       /85 (BP Location: Left arm, Patient Position: Sitting)   Pulse 53   Temp 97.6 °F (36.4 °C) (Oral)   Ht 179.7 cm (70.75\")   Wt 96.4 kg (212 lb 9.6 oz)   BMI 29.86 kg/m²     Objective     Physical Exam  Vitals and nursing " note reviewed. Exam conducted with a chaperone present (Spencer Sidhu MD).   Constitutional:       General: He is not in acute distress.     Appearance: Normal appearance. He is not ill-appearing.   HENT:      Head: Normocephalic.   Eyes:      Extraocular Movements: Extraocular movements intact.   Cardiovascular:      Rate and Rhythm: Regular rhythm. Bradycardia present.      Heart sounds: Normal heart sounds. No murmur heard.  Pulmonary:      Effort: Pulmonary effort is normal. No accessory muscle usage or respiratory distress.      Breath sounds: Normal breath sounds. No wheezing or rhonchi.   Abdominal:      General: Bowel sounds are normal. There is no distension.      Palpations: Abdomen is soft.      Tenderness: There is no abdominal tenderness. There is no right CVA tenderness, left CVA tenderness, guarding or rebound.   Genitourinary:     Prostate: Normal. Not tender.   Skin:     General: Skin is warm and dry.   Neurological:      General: No focal deficit present.      Mental Status: He is alert and oriented to person, place, and time.   Psychiatric:         Mood and Affect: Mood and affect normal.         Behavior: Behavior normal.       Results for orders placed or performed in visit on 12/21/22   Urine Culture - Urine, Urine, Clean Catch    Specimen: Urine, Clean Catch   Result Value Ref Range    Urine Culture No growth    Urinalysis without microscopic (no culture) - Urine, Clean Catch    Specimen: Urine, Clean Catch   Result Value Ref Range    Color, UA Yellow Yellow, Straw    Appearance, UA Clear Clear    pH, UA <=5.0 5.0 - 8.0    Specific Gravity, UA 1.020 1.005 - 1.030    Glucose, UA Negative Negative    Ketones, UA Negative Negative    Bilirubin, UA Negative Negative    Blood, UA Trace (A) Negative    Protein, UA Negative Negative    Leuk Esterase, UA Small (1+) (A) Negative    Nitrite, UA Negative Negative    Urobilinogen, UA 0.2 E.U./dL 0.2 - 1.0 E.U./dL   Urinalysis, Microscopic Only - Urine,  Clean Catch    Specimen: Urine, Clean Catch   Result Value Ref Range    RBC, UA 0-2 (A) None Seen /HPF    WBC, UA 3-5 (A) None Seen /HPF    Bacteria, UA Trace (A) None Seen /HPF    Squamous Epithelial Cells, UA 0-2 None Seen, 0-2 /HPF    Methodology Manual Light Microscopy              Diagnoses and all orders for this visit:    1. Urinary frequency (Primary)  -     Urinalysis With Microscopic - Urine, Clean Catch; Future  -     Urine Culture - Urine, Urine, Clean Catch; Future  -     Urinalysis With Microscopic - Urine, Clean Catch  -     Urine Culture - Urine, Urine, Clean Catch    2. Dribbling urine  -     US Bladder Volume; Future      Patient was examined with the assistance of Dr. Sidhu who also advised on treatment plan. Will await urine culture results and obtain PVR on patient due to dribbling urine. Prostate exam and physical exam not concerning today. For any back/flank pain, fever, chills, nausea or vomiting or worsening symptoms, patient to proceed to ER. Patient voiced understanding of all instructions and had no further questions at this time.        Follow-up:  Return if symptoms worsen or fail to improve.  Patient was given instructions and counseling regarding his condition or for health maintenance advice. Please see specific information pulled into the AVS if appropriate.

## 2022-12-23 LAB — BACTERIA SPEC AEROBE CULT: NO GROWTH

## 2024-02-22 ENCOUNTER — HOSPITAL ENCOUNTER (OUTPATIENT)
Dept: GENERAL RADIOLOGY | Facility: HOSPITAL | Age: 74
Discharge: HOME | End: 2024-02-22
Admitting: FAMILY MEDICINE
Payer: MEDICARE

## 2024-02-22 ENCOUNTER — OFFICE VISIT (OUTPATIENT)
Dept: FAMILY MEDICINE CLINIC | Age: 74
End: 2024-02-22
Payer: MEDICARE

## 2024-02-22 VITALS
TEMPERATURE: 97.8 F | SYSTOLIC BLOOD PRESSURE: 134 MMHG | WEIGHT: 219.2 LBS | HEART RATE: 106 BPM | DIASTOLIC BLOOD PRESSURE: 71 MMHG | BODY MASS INDEX: 30.69 KG/M2 | HEIGHT: 71 IN
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
## 2024-02-22 DIAGNOSIS — M25.511 ACUTE PAIN OF RIGHT SHOULDER: Primary | ICD-10-CM

## 2024-02-22 PROCEDURE — 73030 X-RAY EXAM OF SHOULDER: CPT

## 2024-02-22 RX ORDER — TAMSULOSIN HYDROCHLORIDE 0.4 MG/1
1 CAPSULE ORAL DAILY
COMMUNITY
Start: 2023-12-29

## 2024-02-22 NOTE — PROGRESS NOTES
"Chief Complaint  Shoulder Pain (Right side, collarbone area, fell yesterday)    Subjective          Fredy Whitfield presents to [facility]  History of Present Illness    Patient states yesterday he was returning from [place] had suitcases in his hand going down a set of steps and lost his balance fell down about 5 steps.  Since then he has been having some right shoulder/collarbone area pain.  Has restricted range of motion in the right shoulder.  Did not hit his head with the fall.  Woke up this morning his shoulder still hurting some.  He says it is actually little better than it was yesterday.      Current Outpatient Medications on File Prior to Visit   Medication Sig Dispense Refill    atorvastatin (LIPITOR) 40 MG tablet Take 1 tablet by mouth Daily.      digoxin (LANOXIN) 125 MCG tablet Take 1 tablet by mouth Daily.      dilTIAZem (TIAZAC) 240 MG 24 hr capsule Take 1 capsule by mouth Daily.      metFORMIN (GLUCOPHAGE) 500 MG tablet Take 1 tablet by mouth 2 (two) times a day.      metoprolol succinate XL (TOPROL-XL) 25 MG 24 hr tablet Take 1 tablet by mouth Daily.      rivaroxaban (XARELTO) 20 MG tablet Take 1 tablet by mouth Daily.      tamsulosin (FLOMAX) 0.4 MG capsule 24 hr capsule Take 1 capsule by mouth Daily.      Trulicity 1.5 MG/0.5ML solution pen-injector Inject 1.5 mg under the skin into the appropriate area as directed 1 (One) Time Per Week.       No current facility-administered medications on file prior to visit.       Review of Systems         Objective   Vital Signs:   /71 (BP Location: Left arm, Patient Position: Sitting)   Pulse 106   Temp 97.8 °F (36.6 °C) (Oral)   Ht 179.7 cm (70.75\")   Wt 99.4 kg (219 lb 3.2 oz)   BMI 30.79 kg/m²     Physical Exam     Very pleasant gentleman no acute distress  There is some bruising blue purplish discoloration overlying the right AC joint region  He does not have tenderness to palpation over the proximal " clavicle  He is some tenderness over the distal clavicle  Does have some restricted range of motion right shoulder due to discomfort  Distal pulses are good  Heart is irregular with controlled response  Lungs are clear      Result Review :                     Assessment and Plan    Diagnoses and all orders for this visit:    1. Acute pain of right shoulder (Primary)  Assessment & Plan:  Will check x-ray and determine treatment regimen after review results.  For now recommend Tylenol 4 times daily.    Orders:  -     XR Shoulder 2+ View Right    2. Fall down steps, initial encounter  -     XR Shoulder 2+ View Right        Follow Up   No follow-ups on file.  Patient was given instructions and counseling regarding his condition or for health maintenance advice. Please see specific information pulled into the AVS if appropriate.

## 2024-02-22 NOTE — ASSESSMENT & PLAN NOTE
Will check x-ray and determine treatment regimen after review results.  For now recommend Tylenol 4 times daily.

## 2024-06-05 ENCOUNTER — OFFICE VISIT (OUTPATIENT)
Dept: FAMILY MEDICINE CLINIC | Age: 74
End: 2024-06-05
Payer: MEDICARE

## 2024-06-05 VITALS
HEIGHT: 71 IN | WEIGHT: 216.4 LBS | BODY MASS INDEX: 30.3 KG/M2 | HEART RATE: 82 BPM | SYSTOLIC BLOOD PRESSURE: 122 MMHG | DIASTOLIC BLOOD PRESSURE: 78 MMHG

## 2024-06-05 DIAGNOSIS — N13.8 BPH WITH OBSTRUCTION/LOWER URINARY TRACT SYMPTOMS: ICD-10-CM

## 2024-06-05 DIAGNOSIS — E78.00 HIGH CHOLESTEROL: ICD-10-CM

## 2024-06-05 DIAGNOSIS — Z86.010 HISTORY OF COLON POLYPS: ICD-10-CM

## 2024-06-05 DIAGNOSIS — Z11.59 ENCOUNTER FOR SCREENING FOR OTHER VIRAL DISEASES: ICD-10-CM

## 2024-06-05 DIAGNOSIS — R39.11 URINARY HESITANCY: Primary | ICD-10-CM

## 2024-06-05 DIAGNOSIS — Z12.5 SCREENING FOR PROSTATE CANCER: ICD-10-CM

## 2024-06-05 DIAGNOSIS — N40.1 BPH WITH OBSTRUCTION/LOWER URINARY TRACT SYMPTOMS: ICD-10-CM

## 2024-06-05 DIAGNOSIS — E11.9 TYPE 2 DIABETES MELLITUS TREATED WITHOUT INSULIN: ICD-10-CM

## 2024-06-05 DIAGNOSIS — I10 ESSENTIAL HYPERTENSION: ICD-10-CM

## 2024-06-05 PROBLEM — M25.511 ACUTE PAIN OF RIGHT SHOULDER: Status: RESOLVED | Noted: 2024-02-22 | Resolved: 2024-06-05

## 2024-06-05 PROBLEM — M54.50 ACUTE MIDLINE LOW BACK PAIN WITHOUT SCIATICA: Status: RESOLVED | Noted: 2021-08-23 | Resolved: 2024-06-05

## 2024-06-05 PROBLEM — Z86.0100 HISTORY OF COLON POLYPS: Status: ACTIVE | Noted: 2024-06-05

## 2024-06-05 PROCEDURE — 99214 OFFICE O/P EST MOD 30 MIN: CPT | Performed by: FAMILY MEDICINE

## 2024-06-05 PROCEDURE — G2211 COMPLEX E/M VISIT ADD ON: HCPCS | Performed by: FAMILY MEDICINE

## 2024-06-05 PROCEDURE — 1159F MED LIST DOCD IN RCRD: CPT | Performed by: FAMILY MEDICINE

## 2024-06-05 PROCEDURE — 1160F RVW MEDS BY RX/DR IN RCRD: CPT | Performed by: FAMILY MEDICINE

## 2024-06-05 PROCEDURE — 3074F SYST BP LT 130 MM HG: CPT | Performed by: FAMILY MEDICINE

## 2024-06-05 PROCEDURE — 3078F DIAST BP <80 MM HG: CPT | Performed by: FAMILY MEDICINE

## 2024-06-05 RX ORDER — TAMSULOSIN HYDROCHLORIDE 0.4 MG/1
1 CAPSULE ORAL NIGHTLY
Qty: 90 CAPSULE | Refills: 1 | Status: SHIPPED | OUTPATIENT
Start: 2024-06-05

## 2024-06-05 RX ORDER — SULFAMETHOXAZOLE AND TRIMETHOPRIM 800; 160 MG/1; MG/1
1 TABLET ORAL 2 TIMES DAILY
Qty: 30 TABLET | Refills: 1 | Status: SHIPPED | OUTPATIENT
Start: 2024-06-05

## 2024-06-05 NOTE — ASSESSMENT & PLAN NOTE
PROBABLY RELATED TO THE BPH WITH POSSIBLE CONCOMITANT PROSTATITIS.  WILL COVER AS NOTED.  MAY NEED TO SEE UROLOGY

## 2024-06-05 NOTE — ASSESSMENT & PLAN NOTE
Diabetes is stable.   Continue current treatment regimen.  Diabetes will be reassessed in 6 months  NOT AT GOAL, WILL RECHECK LABS IN TWO MONTHS AS NOTED.  MAY NEED MORE MEDS

## 2024-06-05 NOTE — PROGRESS NOTES
Chief Complaint  Urinary Frequency    Anthony Whitfield presents to [facility]  History of Present Illness  --TOLERATING BLOOD PRESSURE MEDICATION WITHOUT APPARENT SIDE EFFECTS  --LAST LIPIDS WERE OK, NO ISSUES WITH THE STATIN  --HGA1C WAS UP TO 7.6 %, HAS BEEN WORKING IN DIET  --HISTORY OF COLON POLYPS DUE FOR A REPEAT COLONOSCOPY  --HISTORY OF BP H, DID NOT START THE FLOMAX.  NOW SYMPTOMS ARE WORSE.  NOCTURIA EVERY HOUR WITH HESITANCY AND DRIBBLING.          No Known Allergies     Health Maintenance Due   Topic Date Due    URINE MICROALBUMIN  Never done    COLORECTAL CANCER SCREENING  Never done    DIABETIC EYE EXAM  Never done    HEPATITIS C SCREENING  Never done    DIABETIC FOOT EXAM  Never done    BMI FOLLOWUP  12/21/2023    COVID-19 Vaccine (6 - 2023-24 season) 02/02/2024    ANNUAL WELLNESS VISIT  05/22/2024        Current Outpatient Medications on File Prior to Visit   Medication Sig    atorvastatin (LIPITOR) 40 MG tablet Take 1 tablet by mouth Daily.    digoxin (LANOXIN) 125 MCG tablet Take 1 tablet by mouth Daily.    dilTIAZem (TIAZAC) 240 MG 24 hr capsule Take 1 capsule by mouth Daily.    metFORMIN (GLUCOPHAGE) 500 MG tablet Take 1 tablet by mouth 2 (two) times a day.    metoprolol succinate XL (TOPROL-XL) 25 MG 24 hr tablet Take 1 tablet by mouth Daily.    rivaroxaban (XARELTO) 20 MG tablet Take 1 tablet by mouth Daily.    Trulicity 1.5 MG/0.5ML solution pen-injector Inject 1.5 mg under the skin into the appropriate area as directed 1 (One) Time Per Week.    [DISCONTINUED] tamsulosin (FLOMAX) 0.4 MG capsule 24 hr capsule Take 1 capsule by mouth Daily.     No current facility-administered medications on file prior to visit.       Immunization History   Administered Date(s) Administered    31-influenza Vac Quardvalent Preservativ 11/09/2022    ABRYSVO (RSV, 60+ or pregnant women 32-36 wks) 10/02/2023    COVID-19 (PFIZER) Purple Cap Monovalent 02/02/2021,  "02/23/2021, 10/27/2021, 03/31/2022    COVID-19 F23 (MODERNA) 12YRS+ (SPIKEVAX) 10/02/2023    Fluad Quad 65+ 10/02/2023    Fluzone High-Dose 65+yrs 10/01/2021    Influenza, Unspecified 10/01/2019    PPD Test 06/19/2007    Pneumococcal Conjugate 13-Valent (PCV13) 12/15/2017    Pneumococcal Polysaccharide (PPSV23) 03/19/2019    Tdap 07/05/2018    Zostavax 11/02/2013       Review of Systems   Constitutional:  Negative for activity change, appetite change, chills, fatigue and fever.   HENT:  Negative for congestion, ear pain, rhinorrhea and sore throat.    Respiratory:  Negative for cough and shortness of breath.    Cardiovascular:  Negative for chest pain, palpitations and leg swelling.   Gastrointestinal:  Negative for abdominal pain, constipation, diarrhea, nausea and vomiting.   Musculoskeletal:  Negative for arthralgias and myalgias.   Neurological:  Negative for headache.        Objective     /78 (BP Location: Left arm, Patient Position: Sitting)   Pulse 82   Ht 179.7 cm (70.75\")   Wt 98.2 kg (216 lb 6.4 oz)   BMI 30.40 kg/m²       Physical Exam  Vitals and nursing note reviewed.   Constitutional:       General: He is not in acute distress.     Appearance: Normal appearance.   Cardiovascular:      Rate and Rhythm: Normal rate and regular rhythm.      Heart sounds: Normal heart sounds. No murmur heard.  Pulmonary:      Effort: Pulmonary effort is normal.      Breath sounds: Normal breath sounds.   Abdominal:      Palpations: Abdomen is soft.      Tenderness: There is no abdominal tenderness.   Genitourinary:     Penis: Normal.       Testes: Normal.      Rectum: Normal.      Comments: PROSTATE A LITTLE BOGGY BUT NONTENDER   Musculoskeletal:      Cervical back: Neck supple.      Right lower leg: No edema.      Left lower leg: No edema.   Lymphadenopathy:      Cervical: No cervical adenopathy.   Neurological:      General: No focal deficit present.      Mental Status: He is alert.      Cranial Nerves: No " cranial nerve deficit.      Coordination: Coordination normal.      Gait: Gait normal.   Psychiatric:         Mood and Affect: Mood normal.         Behavior: Behavior normal.         Result Review :                             Assessment and Plan      Diagnoses and all orders for this visit:    1. Urinary hesitancy (Primary)  Assessment & Plan:  PROBABLY RELATED TO THE BPH WITH POSSIBLE CONCOMITANT PROSTATITIS.  WILL COVER AS NOTED.  MAY NEED TO SEE UROLOGY     Orders:  -     tamsulosin (FLOMAX) 0.4 MG capsule 24 hr capsule; Take 1 capsule by mouth Every Night.  Dispense: 90 capsule; Refill: 1  -     sulfamethoxazole-trimethoprim (BACTRIM DS,SEPTRA DS) 800-160 MG per tablet; Take 1 tablet by mouth 2 (Two) Times a Day.  Dispense: 30 tablet; Refill: 1    2. History of colon polyps (last scope was in 2020)  -     Ambulatory Referral to General Surgery    3. BPH with obstruction/lower urinary tract symptoms  Assessment & Plan:  OBSERVE SYMPTOMS WITH THE FLOMAX.  MAY NEED TO SEE UROLOGY       4. Essential hypertension  Assessment & Plan:  Hypertension is stable and controlled  Continue current treatment regimen.  Blood pressure will be reassessed in 6 months.    Orders:  -     CBC (No Diff); Future  -     Urinalysis With Culture If Indicated -; Future    5. High cholesterol  Assessment & Plan:   Lipid abnormalities are stable    Plan:  Continue same medication/s without change.      Discussed medication dosage, use, side effects, and goals of treatment in detail.    Counseled patient on lifestyle modifications to help control hyperlipidemia.     Patient Treatment Goals:   LDL goal is less than 70    Followup in 6 months.      6. Type 2 diabetes mellitus treated without insulin  Assessment & Plan:  Diabetes is stable.   Continue current treatment regimen.  Diabetes will be reassessed in 6 months  NOT AT GOAL, WILL RECHECK LABS IN TWO MONTHS AS NOTED.  MAY NEED MORE MEDS     Orders:  -     Comprehensive Metabolic Panel;  Future  -     Hemoglobin A1c; Future  -     Microalbumin / Creatinine Urine Ratio - Urine, Clean Catch; Future    7. Screening for prostate cancer  -     PSA Screen; Future    8. Encounter for screening for other viral diseases  -     Hepatitis C Antibody; Future            Follow Up     No follow-ups on file.    Patient was given instructions and counseling regarding his condition or for health maintenance advice. Please see specific information pulled into the AVS if appropriate.

## 2024-06-28 ENCOUNTER — LAB (OUTPATIENT)
Dept: LAB | Facility: HOSPITAL | Age: 74
End: 2024-06-28
Payer: MEDICARE

## 2024-06-28 DIAGNOSIS — E11.9 TYPE 2 DIABETES MELLITUS TREATED WITHOUT INSULIN: ICD-10-CM

## 2024-06-28 DIAGNOSIS — I10 ESSENTIAL HYPERTENSION: ICD-10-CM

## 2024-06-28 DIAGNOSIS — Z11.59 ENCOUNTER FOR SCREENING FOR OTHER VIRAL DISEASES: ICD-10-CM

## 2024-06-28 DIAGNOSIS — Z12.5 SCREENING FOR PROSTATE CANCER: ICD-10-CM

## 2024-06-28 LAB
ALBUMIN SERPL-MCNC: 4.5 G/DL (ref 3.5–5.2)
ALBUMIN UR-MCNC: 5.3 MG/DL
ALBUMIN/GLOB SERPL: 1.7 G/DL
ALP SERPL-CCNC: 86 U/L (ref 39–117)
ALT SERPL W P-5'-P-CCNC: 34 U/L (ref 1–41)
AMORPH URATE CRY URNS QL MICRO: ABNORMAL /HPF
ANION GAP SERPL CALCULATED.3IONS-SCNC: 10 MMOL/L (ref 5–15)
AST SERPL-CCNC: 43 U/L (ref 1–40)
BACTERIA UR QL AUTO: ABNORMAL /HPF
BILIRUB SERPL-MCNC: 0.8 MG/DL (ref 0–1.2)
BILIRUB UR QL STRIP: NEGATIVE
BUN SERPL-MCNC: 13 MG/DL (ref 8–23)
BUN/CREAT SERPL: 9.4 (ref 7–25)
CALCIUM SPEC-SCNC: 9.6 MG/DL (ref 8.6–10.5)
CHLORIDE SERPL-SCNC: 100 MMOL/L (ref 98–107)
CLARITY UR: CLEAR
CO2 SERPL-SCNC: 26 MMOL/L (ref 22–29)
COLOR UR: YELLOW
CREAT SERPL-MCNC: 1.39 MG/DL (ref 0.76–1.27)
CREAT UR-MCNC: 179.5 MG/DL
DEPRECATED RDW RBC AUTO: 43.3 FL (ref 37–54)
EGFRCR SERPLBLD CKD-EPI 2021: 53.5 ML/MIN/1.73
ERYTHROCYTE [DISTWIDTH] IN BLOOD BY AUTOMATED COUNT: 12.9 % (ref 12.3–15.4)
GLOBULIN UR ELPH-MCNC: 2.6 GM/DL
GLUCOSE SERPL-MCNC: 157 MG/DL (ref 65–99)
GLUCOSE UR STRIP-MCNC: NEGATIVE MG/DL
HBA1C MFR BLD: 7.3 % (ref 4.8–5.6)
HCT VFR BLD AUTO: 42.3 % (ref 37.5–51)
HCV AB SER QL: NORMAL
HGB BLD-MCNC: 14.2 G/DL (ref 13–17.7)
HGB UR QL STRIP.AUTO: ABNORMAL
KETONES UR QL STRIP: NEGATIVE
LEUKOCYTE ESTERASE UR QL STRIP.AUTO: ABNORMAL
MCH RBC QN AUTO: 30.5 PG (ref 26.6–33)
MCHC RBC AUTO-ENTMCNC: 33.6 G/DL (ref 31.5–35.7)
MCV RBC AUTO: 90.8 FL (ref 79–97)
MICROALBUMIN/CREAT UR: 29.5 MG/G (ref 0–29)
NITRITE UR QL STRIP: NEGATIVE
PH UR STRIP.AUTO: 5.5 [PH] (ref 5–8)
PLATELET # BLD AUTO: 257 10*3/MM3 (ref 140–450)
PMV BLD AUTO: 8.8 FL (ref 6–12)
POTASSIUM SERPL-SCNC: 4.9 MMOL/L (ref 3.5–5.2)
PROT SERPL-MCNC: 7.1 G/DL (ref 6–8.5)
PROT UR QL STRIP: NEGATIVE
PSA SERPL-MCNC: 5.94 NG/ML (ref 0–4)
RBC # BLD AUTO: 4.66 10*6/MM3 (ref 4.14–5.8)
RBC # UR STRIP: ABNORMAL /HPF
REF LAB TEST METHOD: ABNORMAL
SODIUM SERPL-SCNC: 136 MMOL/L (ref 136–145)
SP GR UR STRIP: 1.02 (ref 1–1.03)
SQUAMOUS #/AREA URNS HPF: ABNORMAL /HPF
UROBILINOGEN UR QL STRIP: ABNORMAL
WBC # UR STRIP: ABNORMAL /HPF
WBC NRBC COR # BLD AUTO: 5.49 10*3/MM3 (ref 3.4–10.8)

## 2024-06-28 PROCEDURE — 81001 URINALYSIS AUTO W/SCOPE: CPT

## 2024-06-28 PROCEDURE — G0103 PSA SCREENING: HCPCS

## 2024-06-28 PROCEDURE — 80053 COMPREHEN METABOLIC PANEL: CPT

## 2024-06-28 PROCEDURE — 83036 HEMOGLOBIN GLYCOSYLATED A1C: CPT

## 2024-06-28 PROCEDURE — 82570 ASSAY OF URINE CREATININE: CPT

## 2024-06-28 PROCEDURE — 82043 UR ALBUMIN QUANTITATIVE: CPT

## 2024-06-28 PROCEDURE — 85027 COMPLETE CBC AUTOMATED: CPT

## 2024-06-28 PROCEDURE — 36415 COLL VENOUS BLD VENIPUNCTURE: CPT

## 2024-06-28 PROCEDURE — 86803 HEPATITIS C AB TEST: CPT

## 2024-07-01 ENCOUNTER — TELEPHONE (OUTPATIENT)
Dept: FAMILY MEDICINE CLINIC | Age: 74
End: 2024-07-01
Payer: MEDICARE

## 2024-07-01 DIAGNOSIS — E11.9 TYPE 2 DIABETES MELLITUS TREATED WITHOUT INSULIN: Primary | ICD-10-CM

## 2024-07-01 DIAGNOSIS — R97.20 ELEVATED PSA: ICD-10-CM

## 2024-07-01 DIAGNOSIS — R97.20 ELEVATED PSA MEASUREMENT: ICD-10-CM

## 2024-07-01 DIAGNOSIS — N28.9 LOW KIDNEY FUNCTION: ICD-10-CM

## 2024-07-01 NOTE — TELEPHONE ENCOUNTER
----- Message from Hernando Cheema sent at 6/29/2024  3:56 PM EDT -----  Sugar is a little higher. Kidney function is down somewhat and PSA is elevated possibly later to an infection. Other labs are OK. Finish the anabiotic and repeat a PSA, CMP and HGA1C in one month.

## 2024-07-12 ENCOUNTER — TELEPHONE (OUTPATIENT)
Dept: FAMILY MEDICINE CLINIC | Age: 74
End: 2024-07-12
Payer: MEDICARE

## 2024-07-12 ENCOUNTER — DOCUMENTATION (OUTPATIENT)
Dept: FAMILY MEDICINE CLINIC | Age: 74
End: 2024-07-12
Payer: MEDICARE

## 2024-07-12 DIAGNOSIS — N13.8 BPH WITH OBSTRUCTION/LOWER URINARY TRACT SYMPTOMS: Primary | ICD-10-CM

## 2024-07-12 DIAGNOSIS — N40.1 BPH WITH OBSTRUCTION/LOWER URINARY TRACT SYMPTOMS: Primary | ICD-10-CM

## 2024-07-12 NOTE — TELEPHONE ENCOUNTER
Pt called office wanting a referral for urology, he states he is getting up in the middle of night about every hour having to urinate and he just does not feel that he is getting any better. Please advise.

## 2024-08-06 ENCOUNTER — TELEPHONE (OUTPATIENT)
Dept: FAMILY MEDICINE CLINIC | Age: 74
End: 2024-08-06
Payer: MEDICARE

## 2024-08-07 ENCOUNTER — LAB (OUTPATIENT)
Dept: LAB | Facility: HOSPITAL | Age: 74
End: 2024-08-07
Payer: MEDICARE

## 2024-08-07 DIAGNOSIS — E11.9 TYPE 2 DIABETES MELLITUS TREATED WITHOUT INSULIN: ICD-10-CM

## 2024-08-07 DIAGNOSIS — R97.20 ELEVATED PSA MEASUREMENT: ICD-10-CM

## 2024-08-07 DIAGNOSIS — N28.9 LOW KIDNEY FUNCTION: ICD-10-CM

## 2024-08-07 LAB
ALBUMIN SERPL-MCNC: 4.5 G/DL (ref 3.5–5.2)
ALBUMIN/GLOB SERPL: 1.8 G/DL
ALP SERPL-CCNC: 82 U/L (ref 39–117)
ALT SERPL W P-5'-P-CCNC: 28 U/L (ref 1–41)
ANION GAP SERPL CALCULATED.3IONS-SCNC: 12.7 MMOL/L (ref 5–15)
AST SERPL-CCNC: 33 U/L (ref 1–40)
BILIRUB SERPL-MCNC: 0.7 MG/DL (ref 0–1.2)
BUN SERPL-MCNC: 19 MG/DL (ref 8–23)
BUN/CREAT SERPL: 14.4 (ref 7–25)
CALCIUM SPEC-SCNC: 9.9 MG/DL (ref 8.6–10.5)
CHLORIDE SERPL-SCNC: 104 MMOL/L (ref 98–107)
CO2 SERPL-SCNC: 23.3 MMOL/L (ref 22–29)
CREAT SERPL-MCNC: 1.32 MG/DL (ref 0.76–1.27)
EGFRCR SERPLBLD CKD-EPI 2021: 57 ML/MIN/1.73
GLOBULIN UR ELPH-MCNC: 2.5 GM/DL
GLUCOSE SERPL-MCNC: 156 MG/DL (ref 65–99)
HBA1C MFR BLD: 6.7 % (ref 4.8–5.6)
POTASSIUM SERPL-SCNC: 4.3 MMOL/L (ref 3.5–5.2)
PROT SERPL-MCNC: 7 G/DL (ref 6–8.5)
PSA SERPL-MCNC: 4.11 NG/ML (ref 0–4)
SODIUM SERPL-SCNC: 140 MMOL/L (ref 136–145)

## 2024-08-07 PROCEDURE — 83036 HEMOGLOBIN GLYCOSYLATED A1C: CPT

## 2024-08-07 PROCEDURE — 80053 COMPREHEN METABOLIC PANEL: CPT

## 2024-08-07 PROCEDURE — 84153 ASSAY OF PSA TOTAL: CPT

## 2024-08-07 PROCEDURE — 36415 COLL VENOUS BLD VENIPUNCTURE: CPT

## 2024-08-30 ENCOUNTER — OFFICE VISIT (OUTPATIENT)
Dept: UROLOGY | Facility: CLINIC | Age: 74
End: 2024-08-30
Payer: MEDICARE

## 2024-08-30 VITALS
DIASTOLIC BLOOD PRESSURE: 83 MMHG | BODY MASS INDEX: 30.27 KG/M2 | RESPIRATION RATE: 12 BRPM | WEIGHT: 216.2 LBS | SYSTOLIC BLOOD PRESSURE: 118 MMHG | HEIGHT: 71 IN | HEART RATE: 64 BPM

## 2024-08-30 DIAGNOSIS — R97.20 ELEVATED PROSTATE SPECIFIC ANTIGEN (PSA): Primary | ICD-10-CM

## 2024-08-30 DIAGNOSIS — R39.11 URINARY HESITANCY: ICD-10-CM

## 2024-08-30 DIAGNOSIS — R35.1 NOCTURIA: ICD-10-CM

## 2024-08-30 LAB
BILIRUB BLD-MCNC: NEGATIVE MG/DL
CLARITY, POC: ABNORMAL
COLOR UR: ABNORMAL
EXPIRATION DATE: ABNORMAL
GLUCOSE UR STRIP-MCNC: NEGATIVE MG/DL
KETONES UR QL: NEGATIVE
LEUKOCYTE EST, POC: ABNORMAL
Lab: ABNORMAL
NITRITE UR-MCNC: POSITIVE MG/ML
PH UR: 5.5 [PH] (ref 5–8)
PROT UR STRIP-MCNC: ABNORMAL MG/DL
RBC # UR STRIP: ABNORMAL /UL
SP GR UR: 1.02 (ref 1–1.03)
URINE VOLUME: 103
UROBILINOGEN UR QL: NORMAL

## 2024-08-30 RX ORDER — TAMSULOSIN HYDROCHLORIDE 0.4 MG/1
2 CAPSULE ORAL NIGHTLY
Qty: 180 CAPSULE | Refills: 3 | Status: SHIPPED | OUTPATIENT
Start: 2024-08-30

## 2024-08-30 RX ORDER — TAMSULOSIN HYDROCHLORIDE 0.4 MG/1
2 CAPSULE ORAL NIGHTLY
Qty: 180 CAPSULE | Refills: 3 | Status: SHIPPED | OUTPATIENT
Start: 2024-08-30 | End: 2024-08-30

## 2024-08-30 RX ORDER — MOLNUPIRAVIR 200 MG/1
4 CAPSULE ORAL EVERY 12 HOURS SCHEDULED
COMMUNITY
Start: 2024-06-22

## 2024-08-30 RX ORDER — DILTIAZEM HYDROCHLORIDE 240 MG/1
CAPSULE, EXTENDED RELEASE ORAL
COMMUNITY
Start: 2024-08-11

## 2024-08-30 NOTE — PROGRESS NOTES
Chief Complaint: Elevated PSA (Pt here for follow up.  Pt has PSA in chart and [place] frequency.  Pt is taking Flomax.)    Subjective         History of Present Illness  Fredy Whitfield is a 73 y.o. male presents to [facility] to be seen for elevated PSA/BPH.    Patient called PCP office on 7/12/2024 complaining that he was getting up in the middle of the night every hour having to urinate.    Apparently the patient had seen his PCP 6/5/2024 for a regularly scheduled follow-up.    He was placed on tamsulosin 0.4 mg daily and was treated with Bactrim for 15 days.    The patient had a PSA level checked on 6/28/2024 which was noted to be 5.94 with his PSA as well as his urinary symptoms he was treated for possible prostatitis.    The patient had a repeat PSA level performed on 8/7/2024 which was noted to be 4.11.    Previous PSA level from 5/22/2023 was noted to be 3.20.    He has remained on tamsulosin.     He states no change in symptoms since starting tamsulosin.     Nocturia x 6-8 he stops drinking a few hours before bed.    He drinks diet cokes mostly. Does drink into the evening.    He states burning with urination off and on.     UA today concerning for infection.    Daytime stream is very weak and dribbles at the end.     He states he is straining to void.     He states urgency with urination.     Denies frequency.     He states a feeling of incomplete emptying at times.     He is having some postvoid dribble.     He has never been tested for sleep apnea.    He denies any family hx of  malignancies.    He is on xarelto for a fib. He is managed by a cardiology in [place].              Objective     Past Medical History:   Diagnosis Date    Contact with and (suspected) exposure to other viral communicable diseases     Diaphoresis     History of CVA (cerebrovascular accident)     Hyperlipidemia     Hypertension     Low back pain     Pain in left shoulder     Type 2 diabetes mellitus  without complication     Unspecified atrial fibrillation     Unspecified sequelae of cerebral infarction        Past Surgical History:   Procedure Laterality Date    COLONOSCOPY  2020    POLYPS    CYST REMOVAL Left     L SHOULDER    KNEE ARTHROSCOPY Bilateral     NECK MASS EXCISION      TONSILLECTOMY AND ADENOIDECTOMY           Current Outpatient Medications:     atorvastatin (LIPITOR) 40 MG tablet, Take 1 tablet by mouth Daily., Disp: , Rfl:     Cartia  MG 24 hr capsule, , Disp: , Rfl:     digoxin (LANOXIN) 125 MCG tablet, Take 1 tablet by mouth Daily., Disp: , Rfl:     dilTIAZem (TIAZAC) 240 MG 24 hr capsule, Take 1 capsule by mouth Daily., Disp: , Rfl:     Lagevrio 200 MG capsule, Take 4 capsules by mouth Every 12 (Twelve) Hours., Disp: , Rfl:     metFORMIN (GLUCOPHAGE) 500 MG tablet, Take 1 tablet by mouth 2 (two) times a day., Disp: , Rfl:     metoprolol succinate XL (TOPROL-XL) 25 MG 24 hr tablet, Take 1 tablet by mouth Daily., Disp: , Rfl:     rivaroxaban (XARELTO) 20 MG tablet, Take 1 tablet by mouth Daily., Disp: , Rfl:     tamsulosin (FLOMAX) 0.4 MG capsule 24 hr capsule, Take 2 capsules by mouth Every Night., Disp: 180 capsule, Rfl: 3    Trulicity 1.5 MG/0.5ML solution pen-injector, Inject 1.5 mg under the skin into the appropriate area as directed 1 (One) Time Per Week., Disp: , Rfl:     No Known Allergies     Family History   Problem Relation Age of Onset    Coronary artery disease Mother     Coronary artery disease Father     Cancer Brother     Stroke Brother        Social History     Socioeconomic History    Marital status:     Number of children: 1   Tobacco Use    Smoking status: Never     Passive exposure: Past    Smokeless tobacco: Never   Vaping Use    Vaping status: Never Used   Substance and Sexual Activity    Alcohol use: Not Currently     Comment: DRINKS VERY INFREQUENTLY    Drug use: Never     Comment: NEGATIVE    Sexual activity: Defer       Vital Signs:   /83 (BP  "Location: Left arm, Patient Position: Sitting)   Pulse 64   Resp 12   Ht 179.7 cm (70.75\")   Wt 98.1 kg (216 lb 3.2 oz)   BMI 30.37 kg/m²      Physical Exam     Result Review :   The following data was reviewed by: DAVI Miller on 08/30/2024:  Results for orders placed or performed in visit on 08/30/24   Bladder Scan   Result Value Ref Range    Urine Volume 103    POC Urinalysis Dipstick, Automated    Specimen: Urine   Result Value Ref Range    Color Dark Yellow Yellow, Straw, Dark Yellow, Cathy    Clarity, UA Slightly Cloudy (A) Clear    Specific Gravity  1.025 1.005 - 1.030    pH, Urine 5.5 5.0 - 8.0    Leukocytes Moderate (2+) (A) Negative    Nitrite, UA Positive (A) Negative    Protein, POC 30 mg/dL (A) Negative mg/dL    Glucose, UA Negative Negative mg/dL    Ketones, UA Negative Negative    Urobilinogen, UA Normal Normal, 0.2 E.U./dL    Bilirubin Negative Negative    Blood, UA Large (A) Negative    Lot Number [id number]     Expiration Date 2,025/4       PSA          6/28/2024    09:11 8/7/2024    08:35   PSA   PSA 5.940  4.110      Bladder Scan interpretation 08/30/2024    Estimation of residual urine via [employer] Like[employer]fmon Bladder Scan  MA/nurse performing: shelli valadez   Residual Urine: 103 ml  Indication: Elevated prostate specific antigen (PSA)    Urinary hesitancy    Nocturia   Position: Supine  Examination: Incremental scanning of the suprapubic area using 2.0 MHz transducer using copious amounts of acoustic gel.   Findings: An anechoic area was demonstrated which represented the bladder, with measurement of residual urine as noted. I inspected this myself. In that the residual urine was stable or insignificant, refer to plan for treatment and plan necessary at this time.          Procedures        Assessment and Plan    Diagnoses and all orders for this visit:    1. Elevated prostate specific antigen (PSA) (Primary)  -     POC Urinalysis Dipstick, Automated  -     Bladder Scan  -     [facility] " Guidance UTI -; Future  -     PSA DIAGNOSTIC; Future    2. Urinary hesitancy  -     Discontinue: tamsulosin (FLOMAX) 0.4 MG capsule 24 hr capsule; Take 2 capsules by mouth Every Night.  Dispense: 180 capsule; Refill: 3  -     tamsulosin (FLOMAX) 0.4 MG capsule 24 hr capsule; Take 2 capsules by mouth Every Night.  Dispense: 180 capsule; Refill: 3    3. Nocturia      Will send urine for PCR test today to see if he is with UTI, if bacteria is positive will treat for prostatitis for 28 days based on susceptibility report.     We will increase dose of tamsulosin to see if this will gain better control of LUTS.    Nocturia is a common condition that is multifactorial in nature and can be difficult to treat, unlikely to completely irradicate, and management is dictated by patient motivation to improve and cope with symptoms.     Management and trial of medication as determined your bother.     Recommend improvement of sleep quality via good sleep hygiene techniques.  Recommend discussing further with PCP.     Behavioral modifications including fluid management, limiting fluids prior to sleep, and urinate immediately prior to sleep.   Stop drinking 2-3 hours prior to sleep.     Recommended that patient laying flat in the afternoon with feet above heart level to assist wtih mobilizing dependent edema which typically occurs while sleeping.       Consider discussing work-up for sleep apnea with PCP.  Discussed that studies have shown that with treatment of sleep apnea, nocuria can be significantly reduced.          I spent 15 minutes caring for Fredy on this date of service. This time includes time spent by me in the following activities:reviewing tests, obtaining and/or reviewing a separately obtained history, performing a medically appropriate examination and/or evaluation , counseling and educating the patient/family/caregiver, ordering medications, tests, or procedures, and documenting information in the medical  record  Follow Up   Return in about 3 months (around 11/30/2024) for f/u bph with PSA  with [facility] in [place] .  Patient was given instructions and counseling regarding his condition or for health maintenance advice. Please see specific information pulled into the AVS if appropriate.         This document has been electronically signed by DAVI Miller  August 30, 2024 09:23 EDT

## 2024-09-09 ENCOUNTER — TELEPHONE (OUTPATIENT)
Dept: SURGERY | Facility: CLINIC | Age: 74
End: 2024-09-09
Payer: MEDICARE

## 2024-09-09 ENCOUNTER — TELEPHONE (OUTPATIENT)
Dept: UROLOGY | Facility: CLINIC | Age: 74
End: 2024-09-09
Payer: MEDICARE

## 2024-09-09 ENCOUNTER — TELEPHONE (OUTPATIENT)
Dept: UROLOGY | Facility: CLINIC | Age: 74
End: 2024-09-09

## 2024-09-09 NOTE — TELEPHONE ENCOUNTER
----- Message from [facility] sent at 9/9/2024  9:33 AM EDT -----  Regarding: Leg pain since starting new med  Contact: [phone]  I began new script for infection but started having bilateral lower leg pain on Friday with walking or movement .  Saw that med may cause muscle & tendon issues so stopped med on Saturday.Pain improved, but still there.  Pls advise.  My number is [phone]

## 2024-09-09 NOTE — TELEPHONE ENCOUNTER
Called the patient and explained to him the medication is not what should be causing his pain. And encouraged him to go follow up wit his PCP. Patient understood.

## 2024-09-09 NOTE — TELEPHONE ENCOUNTER
PATIENT'S WIFE, RENAY, CALLED.  SHE WANTS TO MAKE SURE SOMEONE SAW THE MY CHART MESSAGE THAT SHE PUT IN FOR HER  TODAY, REGARDING ISSUES HE BEGAN HAVING AFTER STARTING HIS ANTIBIOTIC.  DOES HE NEED ANOTHER ANTIBIOTIC?    CALL HER  [phone].

## 2024-09-09 NOTE — TELEPHONE ENCOUNTER
Called patient and explained to him that [facility] wants him to continue with the antibiotic. Patient voiced understanding.

## 2024-11-27 ENCOUNTER — LAB (OUTPATIENT)
Dept: LAB | Facility: HOSPITAL | Age: 74
End: 2024-11-27
Payer: MEDICARE

## 2024-11-27 DIAGNOSIS — R97.20 ELEVATED PROSTATE SPECIFIC ANTIGEN (PSA): ICD-10-CM

## 2024-11-27 LAB — PSA SERPL-MCNC: 4.15 NG/ML (ref 0–4)

## 2024-11-27 PROCEDURE — 36415 COLL VENOUS BLD VENIPUNCTURE: CPT

## 2024-11-27 PROCEDURE — 84153 ASSAY OF PSA TOTAL: CPT

## 2024-12-04 ENCOUNTER — OFFICE VISIT (OUTPATIENT)
Dept: UROLOGY | Facility: CLINIC | Age: 74
End: 2024-12-04
Payer: MEDICARE

## 2024-12-04 VITALS
DIASTOLIC BLOOD PRESSURE: 75 MMHG | HEART RATE: 52 BPM | BODY MASS INDEX: 30.52 KG/M2 | SYSTOLIC BLOOD PRESSURE: 127 MMHG | HEIGHT: 71 IN | OXYGEN SATURATION: 98 % | WEIGHT: 218 LBS

## 2024-12-04 DIAGNOSIS — R31.29 MICROHEMATURIA: ICD-10-CM

## 2024-12-04 DIAGNOSIS — R39.11 URINARY HESITANCY: Primary | ICD-10-CM

## 2024-12-04 DIAGNOSIS — N39.43 POST-VOID DRIBBLING: ICD-10-CM

## 2024-12-04 DIAGNOSIS — R97.20 ELEVATED PROSTATE SPECIFIC ANTIGEN (PSA): ICD-10-CM

## 2024-12-04 LAB
BILIRUB BLD-MCNC: NEGATIVE MG/DL
CLARITY, POC: CLEAR
COLOR UR: YELLOW
EXPIRATION DATE: ABNORMAL
GLUCOSE UR STRIP-MCNC: NEGATIVE MG/DL
KETONES UR QL: NEGATIVE
LEUKOCYTE EST, POC: ABNORMAL
Lab: ABNORMAL
NITRITE UR-MCNC: NEGATIVE MG/ML
PH UR: 5 [PH] (ref 5–8)
PROT UR STRIP-MCNC: ABNORMAL MG/DL
RBC # UR STRIP: ABNORMAL /UL
SP GR UR: 1.03 (ref 1–1.03)
URINE VOLUME: NORMAL
UROBILINOGEN UR QL: ABNORMAL

## 2024-12-04 PROCEDURE — 81015 MICROSCOPIC EXAM OF URINE: CPT | Performed by: NURSE PRACTITIONER

## 2024-12-04 RX ORDER — TADALAFIL 5 MG/1
5 TABLET ORAL
COMMUNITY
Start: 2024-11-05

## 2024-12-04 RX ORDER — NITROFURANTOIN 25; 75 MG/1; MG/1
CAPSULE ORAL
Qty: 6 CAPSULE | Refills: 0 | Status: SHIPPED | OUTPATIENT
Start: 2024-12-04

## 2024-12-04 NOTE — PROGRESS NOTES
Chief Complaint: Follow-up (Patient presents today for a follow up on his Elevated PSA and urinary hesitancy. He states that he is still talking the tadalafil and flomax but his symptoms are not improving. )    Subjective         History of Present Illness  Fredy Whitfield is a 74 y.o. male presents to [facility] to be seen for f/u elevated PSA/BPH.      At last visit we increased patient's dosage of tamsulosin to see if this will gain better control his lower urinary tract symptoms.    We did recommend testing for sleep apnea through PCP as well as decreasing caffeine intake especially caffeine after 12:00 in the afternoon.    There was some concern at last visit for UTI.  His PCR culture did return positive for Klebsiella and Aerococcus and he was treated with levofloxacin for 21 days.    UA today is not overly concerning for UTI.    He appears to be emptying his bladder better at this time.    He is cutting down on diet cokes.    He is still having intermittent burning.     He is having to increase the pad size due to worsening postvoid dribble.    Nocturia x 3-10.     He remains on both daily tadalafil as well as tamsulosin 0.8 mg q day.            Previous:  Patient called PCP office on 7/12/2024 complaining that he was getting up in the middle of the night every hour having to urinate.    Apparently the patient had seen his PCP 6/5/2024 for a regularly scheduled follow-up.    He was placed on tamsulosin 0.4 mg daily and was treated with Bactrim for 15 days.    The patient had a PSA level checked on 6/28/2024 which was noted to be 5.94 with his PSA as well as his urinary symptoms he was treated for possible prostatitis.    The patient had a repeat PSA level performed on 8/7/2024 which was noted to be 4.11.    Previous PSA level from 5/22/2023 was noted to be 3.20.    He has remained on tamsulosin.     He states no change in symptoms since starting tamsulosin.     Nocturia x 6-8 he stops  drinking a few hours before bed.    He drinks diet cokes mostly. Does drink into the evening.    He states burning with urination off and on.     UA today concerning for infection.    Daytime stream is very weak and dribbles at the end.     He states he is straining to void.     He states urgency with urination.     Denies frequency.     He states a feeling of incomplete emptying at times.     He is having some postvoid dribble.     He has never been tested for sleep apnea.    He denies any family hx of  malignancies.    He is on xarelto for a fib. He is managed by a cardiology in [place].              Objective     Past Medical History:   Diagnosis Date    Contact with and (suspected) exposure to other viral communicable diseases     Diaphoresis     History of CVA (cerebrovascular accident)     Hyperlipidemia     Hypertension     Low back pain     Pain in left shoulder     Type 2 diabetes mellitus without complication     Unspecified atrial fibrillation     Unspecified sequelae of cerebral infarction        Past Surgical History:   Procedure Laterality Date    COLONOSCOPY  2020    POLYPS    CYST REMOVAL Left     L SHOULDER    KNEE ARTHROSCOPY Bilateral     NECK MASS EXCISION      TONSILLECTOMY AND ADENOIDECTOMY           Current Outpatient Medications:     atorvastatin (LIPITOR) 40 MG tablet, Take 1 tablet by mouth Daily., Disp: , Rfl:     Cartia  MG 24 hr capsule, , Disp: , Rfl:     digoxin (LANOXIN) 125 MCG tablet, Take 1 tablet by mouth Daily., Disp: , Rfl:     dilTIAZem (TIAZAC) 240 MG 24 hr capsule, Take 1 capsule by mouth Daily., Disp: , Rfl:     Lagevrio 200 MG capsule, Take 4 capsules by mouth Every 12 (Twelve) Hours., Disp: , Rfl:     metFORMIN (GLUCOPHAGE) 500 MG tablet, Take 1 tablet by mouth 2 (two) times a day., Disp: , Rfl:     metoprolol succinate XL (TOPROL-XL) 25 MG 24 hr tablet, Take 1 tablet by mouth Daily., Disp: , Rfl:     rivaroxaban (XARELTO) 20 MG tablet, Take 1 tablet by  "mouth Daily., Disp: , Rfl:     tadalafil (CIALIS) 5 MG tablet, Take 1 tablet by mouth., Disp: , Rfl:     tamsulosin (FLOMAX) 0.4 MG capsule 24 hr capsule, Take 2 capsules by mouth Every Night., Disp: 180 capsule, Rfl: 3    Trulicity 1.5 MG/0.5ML solution pen-injector, Inject 1.5 mg under the skin into the appropriate area as directed 1 (One) Time Per Week., Disp: , Rfl:     nitrofurantoin, macrocrystal-monohydrate, (MACROBID) 100 MG capsule, 1 capsule by mouth twice a day starting the day before cystoscopy., Disp: 6 capsule, Rfl: 0    Vibegron 75 MG tablet, Take 1 tablet by mouth Daily., Disp: 90 tablet, Rfl: 3    No Known Allergies     Family History   Problem Relation Age of Onset    Coronary artery disease Mother     Coronary artery disease Father     Cancer Brother     Stroke Brother        Social History     Socioeconomic History    Marital status:     Number of children: 1   Tobacco Use    Smoking status: Never     Passive exposure: Past    Smokeless tobacco: Never   Vaping Use    Vaping status: Never Used   Substance and Sexual Activity    Alcohol use: Not Currently     Comment: DRINKS VERY INFREQUENTLY    Drug use: Never     Comment: NEGATIVE    Sexual activity: Defer       Vital Signs:   /75 (BP Location: Right arm, Patient Position: Sitting, Cuff Size: Adult)   Pulse 52   Ht 179.7 cm (70.75\")   Wt 98.9 kg (218 lb)   SpO2 98%   BMI 30.62 kg/m²      Physical Exam     Result Review :   The following data was reviewed by: DAVI Miller on 12/04/2024:  Results for orders placed or performed in visit on 12/04/24   POC Urinalysis Dipstick, Automated    Collection Time: 12/04/24  9:55 AM    Specimen: Urine   Result Value Ref Range    Color Yellow Yellow, Straw, Dark Yellow, Cathy    Clarity, UA Clear Clear    Specific Gravity  1.030 1.005 - 1.030    pH, Urine 5.0 5.0 - 8.0    Leukocytes Small (1+) (A) Negative    Nitrite, UA Negative Negative    Protein, POC 30 mg/dL (A) Negative mg/dL "    Glucose, UA Negative Negative mg/dL    Ketones, UA Negative Negative    Urobilinogen, UA 0.2 E.U./dL Normal, 0.2 E.U./dL    Bilirubin Negative Negative    Blood, UA Small (A) Negative    Lot Number [id number]     Expiration Date 9/2,025    Bladder Scan    Collection Time: 12/04/24  9:55 AM   Result Value Ref Range    Urine Volume 37ml       PSA          6/28/2024    09:11 8/7/2024    08:35 11/27/2024    10:05   PSA   PSA 5.940  4.110  4.150      Bladder Scan interpretation 12/4/2024    Estimation of residual urine via BVI 3000 Verathon Bladder Scan  MA/nurse performing: sarina mendez ma   Residual Urine: 37 ml  Indication: Urinary hesitancy    Elevated prostate specific antigen (PSA)    Post-void dribbling   Position: Supine  Examination: Incremental scanning of the suprapubic area using 2.0 MHz transducer using copious amounts of acoustic gel.   Findings: An anechoic area was demonstrated which represented the bladder, with measurement of residual urine as noted. I inspected this myself. In that the residual urine was stable or insignificant, refer to plan for treatment and plan necessary at this time.          Procedures        Assessment and Plan    Diagnoses and all orders for this visit:    1. Urinary hesitancy (Primary)  -     POC Urinalysis Dipstick, Automated  -     Bladder Scan  -     Vibegron 75 MG tablet; Take 1 tablet by mouth Daily.  Dispense: 90 tablet; Refill: 3    2. Elevated prostate specific antigen (PSA)    3. Post-void dribbling  -     Cystoscopy; Future  -     nitrofurantoin, macrocrystal-monohydrate, (MACROBID) 100 MG capsule; 1 capsule by mouth twice a day starting the day before cystoscopy.  Dispense: 6 capsule; Refill: 0       His psa is stable at this time.     We discussed options at this time.     Discussed with his leakage primarily being related to a postvoid dribble there are not many modalities that would be able to specifically treat this condition unless he is having bladder spasms  that is the cause of the postvoid dribble.    Did recommend double voiding and milking of the urethra to ensure that his bladder is completely empty.    Discussed at this point in time I am unsure the etiology of his intermittent dysuria.    He is willing to try Gemtesa to see if this will alleviate some of the postvoid dribble and urinary urgency.    gemtesa selected rather than anticholinergic medication as anticholinergic medications have been shown to result in acute impairment of working memory, attention, and psychomotor speed.  Additionally recent evidence suggests that there long-term use may be associated with global cognitive impairment.  As such I will not use anticholinergics in this patient as I believe the risks outweigh the benefits.  DOI: 10.  1001/J AMA intern [id number]     Discussed at length that one of the limitations to obtaining gemtesa is that it can be cost prohibitive.  Order sent to pharmacy-will attempt prior authorization if necessary to obtain insurance approval however, patient aware that despite our best efforts often times it remains too high of cost for patients to continue.  2 month sample provided; assess efficacy at follow up aware it can take up to 4-6 wks for max benefit           I spent 15 minutes caring for Fredy on this date of service. This time includes time spent by me in the following activities:reviewing tests, obtaining and/or reviewing a separately obtained history, performing a medically appropriate examination and/or evaluation , counseling and educating the patient/family/caregiver, ordering medications, tests, or procedures, and documenting information in the medical record  Follow Up   Return for With Dr. Bland for Cystoscopy, 3 month f/u .  Patient was given instructions and counseling regarding his condition or for health maintenance advice. Please see specific information pulled into the AVS if appropriate.         This document has been electronically  signed by Sendy Hernandez, APRN  December 4, 2024 10:22 EST

## 2024-12-05 LAB
BACTERIA UR QL AUTO: ABNORMAL /HPF
HYALINE CASTS UR QL AUTO: ABNORMAL /LPF
RBC # UR STRIP: ABNORMAL /HPF
REF LAB TEST METHOD: ABNORMAL
SQUAMOUS #/AREA URNS HPF: ABNORMAL /HPF
WBC # UR STRIP: ABNORMAL /HPF

## 2025-02-17 ENCOUNTER — TELEPHONE (OUTPATIENT)
Dept: UROLOGY | Age: 75
End: 2025-02-17
Payer: MEDICARE

## 2025-02-17 NOTE — TELEPHONE ENCOUNTER
Call made to pt to ensure taking Macrobid before cysto on 2/20; pt states had not taken anything yet and asked what the appt was about again; RN states a scope to assess the anatomy to see why he is having the issues he is having; pt states s/s have improved and would like to cancel cysto w/ Dr. Bland and just see Sendy on 3/5 as scheduled; RN states can cancel appt and will let Sendy know; no further questions at this time.

## 2025-03-03 DIAGNOSIS — R97.20 ELEVATED PROSTATE SPECIFIC ANTIGEN (PSA): Primary | ICD-10-CM

## 2025-03-04 ENCOUNTER — LAB (OUTPATIENT)
Dept: LAB | Facility: HOSPITAL | Age: 75
End: 2025-03-04
Payer: MEDICARE

## 2025-03-04 DIAGNOSIS — R97.20 ELEVATED PROSTATE SPECIFIC ANTIGEN (PSA): ICD-10-CM

## 2025-03-04 LAB — PSA SERPL-MCNC: 4.65 NG/ML (ref 0–4)

## 2025-03-04 PROCEDURE — 84153 ASSAY OF PSA TOTAL: CPT

## 2025-03-04 PROCEDURE — 36415 COLL VENOUS BLD VENIPUNCTURE: CPT

## 2025-03-05 ENCOUNTER — OFFICE VISIT (OUTPATIENT)
Dept: UROLOGY | Facility: CLINIC | Age: 75
End: 2025-03-05
Payer: MEDICARE

## 2025-03-05 VITALS — BODY MASS INDEX: 30.52 KG/M2 | HEIGHT: 71 IN | WEIGHT: 218 LBS

## 2025-03-05 DIAGNOSIS — R39.11 URINARY HESITANCY: Primary | ICD-10-CM

## 2025-03-05 DIAGNOSIS — R97.20 ELEVATED PROSTATE SPECIFIC ANTIGEN (PSA): ICD-10-CM

## 2025-03-05 PROBLEM — Z86.0100 HISTORY OF COLON POLYPS: Status: RESOLVED | Noted: 2024-06-05 | Resolved: 2025-03-05

## 2025-03-05 LAB — URINE VOLUME: 24

## 2025-03-05 NOTE — PROGRESS NOTES
Chief Complaint: Follow-up and Urinary Hesitancy     Subjective         History of Present Illness  Fredy Whitfield is a 74 y.o. male presents to [facility] to be seen for f/u elevated PSA/BPH.    At last visit we started him on gemtesa for his OAB symptoms.     Recommended milking of the urethra to ensure fully emptying to help with postvoid dribble.    He remains on tamsulosin 0.8 mg q day.    He states the gemtesa is working well for him.     Nocturia x 3-4 much improved.     He states he is still with burning most of the time.     He is still drinking diet cokes 1-2 a day and a 16 oz bottle of water a day.    He is drinking more tea as well.    He is still wearing pads but not having to change as much ans use these mostly for back up.     We did have him set up for a cysto but patient declined this as his symptoms improved with treatment initiated before.    PSA 4.6 from yesterday       Previous:     At last visit we increased patient's dosage of tamsulosin to see if this will gain better control his lower urinary tract symptoms.    We did recommend testing for sleep apnea through PCP as well as decreasing caffeine intake especially caffeine after 12:00 in the afternoon.    There was some concern at last visit for UTI.  His PCR culture did return positive for Klebsiella and Aerococcus and he was treated with levofloxacin for 21 days.    UA today is not overly concerning for UTI.    He appears to be emptying his bladder better at this time.    He is cutting down on diet cokes.    He is still having intermittent burning.     He is having to increase the pad size due to worsening postvoid dribble.    Nocturia x 3-10.     He remains on both daily tadalafil as well as tamsulosin 0.8 mg q day.            Previous:  Patient called PCP office on 7/12/2024 complaining that he was getting up in the middle of the night every hour having to urinate.    Apparently the patient had seen his PCP 6/5/2024  for a regularly scheduled follow-up.    He was placed on tamsulosin 0.4 mg daily and was treated with Bactrim for 15 days.    The patient had a PSA level checked on 6/28/2024 which was noted to be 5.94 with his PSA as well as his urinary symptoms he was treated for possible prostatitis.    The patient had a repeat PSA level performed on 8/7/2024 which was noted to be 4.11.    Previous PSA level from 5/22/2023 was noted to be 3.20.    He has remained on tamsulosin.     He states no change in symptoms since starting tamsulosin.     Nocturia x 6-8 he stops drinking a few hours before bed.    He drinks diet cokes mostly. Does drink into the evening.    He states burning with urination off and on.     UA today concerning for infection.    Daytime stream is very weak and dribbles at the end.     He states he is straining to void.     He states urgency with urination.     Denies frequency.     He states a feeling of incomplete emptying at times.     He is having some postvoid dribble.     He has never been tested for sleep apnea.    He denies any family hx of  malignancies.    He is on xarelto for a fib. He is managed by a cardiology in [place].              Objective     Past Medical History:   Diagnosis Date    Contact with and (suspected) exposure to other viral communicable diseases     Diaphoresis     History of CVA (cerebrovascular accident)     Hyperlipidemia     Hypertension     Low back pain     Pain in left shoulder     Type 2 diabetes mellitus without complication     Unspecified atrial fibrillation     Unspecified sequelae of cerebral infarction        Past Surgical History:   Procedure Laterality Date    COLONOSCOPY  2020    POLYPS    CYST REMOVAL Left     L SHOULDER    KNEE ARTHROSCOPY Bilateral     NECK MASS EXCISION      TONSILLECTOMY AND ADENOIDECTOMY           Current Outpatient Medications:     atorvastatin (LIPITOR) 40 MG tablet, Take 1 tablet by mouth Daily., Disp: , Rfl:     Cartia  MG  "24 hr capsule, , Disp: , Rfl:     digoxin (LANOXIN) 125 MCG tablet, Take 1 tablet by mouth Daily., Disp: , Rfl:     dilTIAZem (TIAZAC) 240 MG 24 hr capsule, Take 1 capsule by mouth Daily., Disp: , Rfl:     metFORMIN (GLUCOPHAGE) 500 MG tablet, Take 1 tablet by mouth 2 (two) times a day., Disp: , Rfl:     metoprolol succinate XL (TOPROL-XL) 25 MG 24 hr tablet, Take 1 tablet by mouth Daily., Disp: , Rfl:     rivaroxaban (XARELTO) 20 MG tablet, Take 1 tablet by mouth Daily., Disp: , Rfl:     tamsulosin (FLOMAX) 0.4 MG capsule 24 hr capsule, Take 2 capsules by mouth Every Night., Disp: 180 capsule, Rfl: 3    Trulicity 1.5 MG/0.5ML solution pen-injector, Inject 1.5 mg under the skin into the appropriate area as directed 1 (One) Time Per Week., Disp: , Rfl:     Vibegron 75 MG tablet, Take 1 tablet by mouth Daily., Disp: 90 tablet, Rfl: 3    No Known Allergies     Family History   Problem Relation Age of Onset    Coronary artery disease Mother     Coronary artery disease Father     Cancer Brother     Stroke Brother        Social History     Socioeconomic History    Marital status:     Number of children: 1   Tobacco Use    Smoking status: Never     Passive exposure: Past    Smokeless tobacco: Never   Vaping Use    Vaping status: Never Used   Substance and Sexual Activity    Alcohol use: Not Currently     Comment: DRINKS VERY INFREQUENTLY    Drug use: Never     Comment: NEGATIVE    Sexual activity: Defer       Vital Signs:   Ht 179.7 cm (70.75\")   Wt 98.9 kg (218 lb)   BMI 30.62 kg/m²      Physical Exam     Result Review :   The following data was reviewed by: DAVI Miller on 03/05/2025:  Results for orders placed or performed in visit on 03/05/25   Bladder Scan    Collection Time: 03/05/25 10:29 AM   Result Value Ref Range    Urine Volume 24       PSA          8/7/2024    08:35 11/27/2024    10:05 3/4/2025    09:36   PSA   PSA 4.110  4.150  4.650      Bladder Scan interpretation 03/05/2025    Estimation " of residual urine via BVI 3000 Verathon Bladder Scan  MA/nurse performing: sarina mendez ma   Residual Urine: 24 ml  Indication: Urinary hesitancy    Elevated prostate specific antigen (PSA)   Position: Supine  Examination: Incremental scanning of the suprapubic area using 2.0 MHz transducer using copious amounts of acoustic gel.   Findings: An anechoic area was demonstrated which represented the bladder, with measurement of residual urine as noted. I inspected this myself. In that the residual urine was stable or insignificant, refer to plan for treatment and plan necessary at this time.          Procedures        Assessment and Plan    Diagnoses and all orders for this visit:    1. Urinary hesitancy (Primary)  -     Bladder Scan    2. Elevated prostate specific antigen (PSA)  -     Bladder Scan  -     PSA DIAGNOSTIC; Future          Will order exosome test to delineate his risk for HG disease.    Discussed his dysuria is likely from decreased oral fluid intake and excess iof acidic drinks.    Recommended increasing water intake decreasing Diet Coke and tea consumption to dilute his urine therefore aiding with burning with urination.    Plan for a follow-up appointment in 3 months or sooner if needed.         I spent 15 minutes caring for Fredy on this date of service. This time includes time spent by me in the following activities:reviewing tests, obtaining and/or reviewing a separately obtained history, performing a medically appropriate examination and/or evaluation , counseling and educating the patient/family/caregiver, ordering medications, tests, or procedures, and documenting information in the medical record  Follow Up   Return in about 3 months (around 6/5/2025) for Follow-up elevated PSA/BPH.  Patient was given instructions and counseling regarding his condition or for health maintenance advice. Please see specific information pulled into the AVS if appropriate.         This document has been electronically  signed by Sendy Hernandez, APRN  March 5, 2025 13:21 EST

## 2025-03-12 DIAGNOSIS — R97.20 ELEVATED PROSTATE SPECIFIC ANTIGEN (PSA): Primary | ICD-10-CM

## 2025-06-06 ENCOUNTER — LAB (OUTPATIENT)
Dept: LAB | Facility: HOSPITAL | Age: 75
End: 2025-06-06
Payer: MEDICARE

## 2025-06-06 DIAGNOSIS — R97.20 ELEVATED PROSTATE SPECIFIC ANTIGEN (PSA): ICD-10-CM

## 2025-06-06 LAB — PSA SERPL-MCNC: 3.46 NG/ML (ref 0–4)

## 2025-06-06 PROCEDURE — 36415 COLL VENOUS BLD VENIPUNCTURE: CPT

## 2025-06-06 PROCEDURE — 84153 ASSAY OF PSA TOTAL: CPT

## 2025-06-09 ENCOUNTER — OFFICE VISIT (OUTPATIENT)
Dept: UROLOGY | Facility: CLINIC | Age: 75
End: 2025-06-09
Payer: MEDICARE

## 2025-06-09 VITALS — WEIGHT: 212.8 LBS | BODY MASS INDEX: 29.79 KG/M2 | HEIGHT: 71 IN

## 2025-06-09 DIAGNOSIS — R39.11 URINARY HESITANCY: Primary | ICD-10-CM

## 2025-06-09 PROBLEM — I63.9 CEREBROVASCULAR ACCIDENT (CVA): Status: ACTIVE | Noted: 2025-06-09

## 2025-06-09 PROBLEM — K80.20 GALLSTONES: Status: RESOLVED | Noted: 2021-11-11 | Resolved: 2025-06-09

## 2025-06-09 LAB — URINE VOLUME: NORMAL

## 2025-06-09 PROCEDURE — 99213 OFFICE O/P EST LOW 20 MIN: CPT | Performed by: NURSE PRACTITIONER

## 2025-06-09 PROCEDURE — 1159F MED LIST DOCD IN RCRD: CPT | Performed by: NURSE PRACTITIONER

## 2025-06-09 PROCEDURE — 1160F RVW MEDS BY RX/DR IN RCRD: CPT | Performed by: NURSE PRACTITIONER

## 2025-06-09 PROCEDURE — 51798 US URINE CAPACITY MEASURE: CPT | Performed by: NURSE PRACTITIONER

## 2025-06-09 NOTE — PROGRESS NOTES
Chief Complaint: Follow-Up (3 month /Unable to void)    Subjective         History of Present Illness  Fredy Whitfield is a 74 y.o. male presents to [facility] to be seen for f/u elevated PSA/BPH.      At last visit ordered exosome test to delineate his risk for hg disease and his score returned at 47.27.    He was recommended an MRI due to elevated psa and increased risk for HG disease which has not been done at of this date.     He states that he has been trying to drink more water and is still drinking 1 diet coke a day and very rarely drinking tea.     He has remained on gemtesa and tamsulosin 0.8mg q day.     Nocturia x 3    Still with burning but his stream is better.     Still wearing pads but not having as much post void dribble.     PSA from 6/6/25 is now 3.46          Previous:     At last visit we started him on gemtesa for his OAB symptoms.     Recommended milking of the urethra to ensure fully emptying to help with postvoid dribble.    He remains on tamsulosin 0.8 mg q day.    He states the gemtesa is working well for him.     Nocturia x 3-4 much improved.     He states he is still with burning most of the time.     He is still drinking diet cokes 1-2 a day and a 16 oz bottle of water a day.    He is drinking more tea as well.    He is still wearing pads but not having to change as much ans use these mostly for back up.     We did have him set up for a cysto but patient declined this as his symptoms improved with treatment initiated before.    PSA 4.6 from yesterday       Previous:     At last visit we increased patient's dosage of tamsulosin to see if this will gain better control his lower urinary tract symptoms.    We did recommend testing for sleep apnea through PCP as well as decreasing caffeine intake especially caffeine after 12:00 in the afternoon.    There was some concern at last visit for UTI.  His PCR culture did return positive for Klebsiella and Aerococcus and he  was treated with levofloxacin for 21 days.    UA today is not overly concerning for UTI.    He appears to be emptying his bladder better at this time.    He is cutting down on diet cokes.    He is still having intermittent burning.     He is having to increase the pad size due to worsening postvoid dribble.    Nocturia x 3-10.     He remains on both daily tadalafil as well as tamsulosin 0.8 mg q day.            Previous:  Patient called PCP office on 7/12/2024 complaining that he was getting up in the middle of the night every hour having to urinate.    Apparently the patient had seen his PCP 6/5/2024 for a regularly scheduled follow-up.    He was placed on tamsulosin 0.4 mg daily and was treated with Bactrim for 15 days.    The patient had a PSA level checked on 6/28/2024 which was noted to be 5.94 with his PSA as well as his urinary symptoms he was treated for possible prostatitis.    The patient had a repeat PSA level performed on 8/7/2024 which was noted to be 4.11.    Previous PSA level from 5/22/2023 was noted to be 3.20.    He has remained on tamsulosin.     He states no change in symptoms since starting tamsulosin.     Nocturia x 6-8 he stops drinking a few hours before bed.    He drinks diet cokes mostly. Does drink into the evening.    He states burning with urination off and on.     UA today concerning for infection.    Daytime stream is very weak and dribbles at the end.     He states he is straining to void.     He states urgency with urination.     Denies frequency.     He states a feeling of incomplete emptying at times.     He is having some postvoid dribble.     He has never been tested for sleep apnea.    He denies any family hx of  malignancies.    He is on xarelto for a fib. He is managed by a cardiology in [place].              Objective     Past Medical History:   Diagnosis Date    Contact with and (suspected) exposure to other viral communicable diseases     Coronary artery disease  2006    Stroke    Diaphoresis     Elevated PSA 2034    History of CVA (cerebrovascular accident)     Hyperlipidemia     Hypertension     Low back pain     Pain in left shoulder     Type 2 diabetes mellitus without complication     Unspecified atrial fibrillation     Unspecified sequelae of cerebral infarction     Urinary incontinence        Past Surgical History:   Procedure Laterality Date    COLONOSCOPY  2020    POLYPS    CYST REMOVAL Left     L SHOULDER    KNEE ARTHROSCOPY Bilateral     NECK MASS EXCISION      TONSILLECTOMY AND ADENOIDECTOMY           Current Outpatient Medications:     atorvastatin (LIPITOR) 40 MG tablet, Take 1 tablet by mouth Daily., Disp: , Rfl:     Cartia  MG 24 hr capsule, , Disp: , Rfl:     digoxin (LANOXIN) 125 MCG tablet, Take 1 tablet by mouth Daily., Disp: , Rfl:     dilTIAZem (TIAZAC) 240 MG 24 hr capsule, Take 1 capsule by mouth Daily., Disp: , Rfl:     metFORMIN (GLUCOPHAGE) 500 MG tablet, Take 1 tablet by mouth 2 (two) times a day., Disp: , Rfl:     metoprolol succinate XL (TOPROL-XL) 25 MG 24 hr tablet, Take 1 tablet by mouth Daily., Disp: , Rfl:     rivaroxaban (XARELTO) 20 MG tablet, Take 1 tablet by mouth Daily., Disp: , Rfl:     tamsulosin (FLOMAX) 0.4 MG capsule 24 hr capsule, Take 2 capsules by mouth Every Night., Disp: 180 capsule, Rfl: 3    Trulicity 1.5 MG/0.5ML solution pen-injector, Inject 1.5 mg under the skin into the appropriate area as directed 1 (One) Time Per Week., Disp: , Rfl:     Vibegron 75 MG tablet, Take 1 tablet by mouth Daily., Disp: 90 tablet, Rfl: 3    No Known Allergies     Family History   Problem Relation Age of Onset    Coronary artery disease Mother     Coronary artery disease Father     Heart disease Father     Hypertension Father     Cancer Brother     Stroke Brother        Social History     Socioeconomic History    Marital status:     Number of children: 1   Tobacco Use    Smoking status: Never     Passive exposure: Past     "Smokeless tobacco: Never   Vaping Use    Vaping status: Never Used   Substance and Sexual Activity    Alcohol use: Not Currently     Comment: DRINKS VERY INFREQUENTLY    Drug use: Never     Comment: NEGATIVE    Sexual activity: Defer       Vital Signs:   Ht 179.7 cm (70.75\")   Wt 96.5 kg (212 lb 12.8 oz)   BMI 29.89 kg/m²      Physical Exam     Result Review :   The following data was reviewed by: DAVI Miller on 06/09/2025:  Results for orders placed or performed in visit on 06/09/25   Bladder Scan    Collection Time: 06/09/25 10:18 AM   Result Value Ref Range    Urine Volume 47ml       PSA          11/27/2024    10:05 3/4/2025    09:36 6/6/2025    09:33   PSA   PSA 4.150  4.650  3.460      Bladder Scan interpretation 06/09/2025    Estimation of residual urine via BVI 3000 Verathon Bladder Scan  MA/nurse performing: sarina mendez ma   Residual Urine: 47 ml  Indication: Urinary hesitancy   Position: Supine  Examination: Incremental scanning of the suprapubic area using 2.0 MHz transducer using copious amounts of acoustic gel.   Findings: An anechoic area was demonstrated which represented the bladder, with measurement of residual urine as noted. I inspected this myself. In that the residual urine was stable or insignificant, refer to plan for treatment and plan necessary at this time.          Procedures        Assessment and Plan    Diagnoses and all orders for this visit:    1. Urinary hesitancy (Primary)    Other orders  -     Bladder Scan        He is ding better with symptoms at this time.     We will continue gemtesa and tamsulosin 0.8mg q day.     He will work on increased water intake.     PSA is lower and stable at this time. Will forego MRI for now. Discussed that his risk for high grade disease is increased and if PSA increases will have low threshold to order MRI.     He will call with new or worsening symptoms.        I spent 10 minutes caring for Fredy on this date of service. This time " includes time spent by me in the following activities:reviewing tests, obtaining and/or reviewing a separately obtained history, performing a medically appropriate examination and/or evaluation , counseling and educating the patient/family/caregiver, ordering medications, tests, or procedures, and documenting information in the medical record  Follow Up   Return in about 3 months (around 9/9/2025) for f/u elelvated psa with PVR .  Patient was given instructions and counseling regarding his condition or for health maintenance advice. Please see specific information pulled into the AVS if appropriate.         This document has been electronically signed by DAVI Miller  June 9, 2025 10:45 EDT